# Patient Record
Sex: MALE | Race: WHITE | NOT HISPANIC OR LATINO | Employment: FULL TIME | URBAN - METROPOLITAN AREA
[De-identification: names, ages, dates, MRNs, and addresses within clinical notes are randomized per-mention and may not be internally consistent; named-entity substitution may affect disease eponyms.]

---

## 2023-04-26 ENCOUNTER — HOSPITAL ENCOUNTER (OUTPATIENT)
Dept: DATA CONVERSION | Facility: HOSPITAL | Age: 68
End: 2023-04-26
Attending: INTERNAL MEDICINE | Admitting: INTERNAL MEDICINE
Payer: MEDICARE

## 2023-04-26 DIAGNOSIS — K21.00 GASTRO-ESOPHAGEAL REFLUX DISEASE WITH ESOPHAGITIS, WITHOUT BLEEDING: ICD-10-CM

## 2023-04-26 DIAGNOSIS — D12.0 BENIGN NEOPLASM OF CECUM: ICD-10-CM

## 2023-04-26 DIAGNOSIS — I47.20 VENTRICULAR TACHYCARDIA, UNSPECIFIED (MULTI): ICD-10-CM

## 2023-04-26 DIAGNOSIS — Z79.82 LONG TERM (CURRENT) USE OF ASPIRIN: ICD-10-CM

## 2023-04-26 DIAGNOSIS — K21.9 GASTRO-ESOPHAGEAL REFLUX DISEASE WITHOUT ESOPHAGITIS: ICD-10-CM

## 2023-04-26 DIAGNOSIS — K62.5 HEMORRHAGE OF ANUS AND RECTUM: ICD-10-CM

## 2023-04-26 DIAGNOSIS — Z90.49 ACQUIRED ABSENCE OF OTHER SPECIFIED PARTS OF DIGESTIVE TRACT: ICD-10-CM

## 2023-04-26 DIAGNOSIS — I49.3 VENTRICULAR PREMATURE DEPOLARIZATION: ICD-10-CM

## 2023-04-26 DIAGNOSIS — R13.11 DYSPHAGIA, ORAL PHASE: ICD-10-CM

## 2023-04-26 DIAGNOSIS — K64.1 SECOND DEGREE HEMORRHOIDS: ICD-10-CM

## 2023-04-26 DIAGNOSIS — Z12.11 ENCOUNTER FOR SCREENING FOR MALIGNANT NEOPLASM OF COLON: ICD-10-CM

## 2023-04-26 DIAGNOSIS — I50.32 CHRONIC DIASTOLIC (CONGESTIVE) HEART FAILURE (MULTI): ICD-10-CM

## 2023-04-26 DIAGNOSIS — Z95.5 PRESENCE OF CORONARY ANGIOPLASTY IMPLANT AND GRAFT: ICD-10-CM

## 2023-05-05 LAB
COMPLETE PATHOLOGY REPORT: NORMAL
CONVERTED CLINICAL DIAGNOSIS-HISTORY: NORMAL
CONVERTED FINAL DIAGNOSIS: NORMAL
CONVERTED FINAL REPORT PDF LINK TO COPY AND PASTE: NORMAL
CONVERTED GROSS DESCRIPTION: NORMAL

## 2023-09-07 VITALS — BODY MASS INDEX: 26.3 KG/M2 | WEIGHT: 198.41 LBS | HEIGHT: 73 IN

## 2023-11-08 ENCOUNTER — HOSPITAL ENCOUNTER (OUTPATIENT)
Dept: CARDIOLOGY | Facility: CLINIC | Age: 68
Discharge: HOME | End: 2023-11-08
Payer: MEDICARE

## 2023-11-08 VITALS
DIASTOLIC BLOOD PRESSURE: 66 MMHG | WEIGHT: 205 LBS | BODY MASS INDEX: 27.77 KG/M2 | SYSTOLIC BLOOD PRESSURE: 112 MMHG | HEIGHT: 72 IN

## 2023-11-08 DIAGNOSIS — I47.29 OTHER VENTRICULAR TACHYCARDIA (MULTI): ICD-10-CM

## 2023-11-08 LAB
AORTIC VALVE MEAN GRADIENT: 5
AORTIC VALVE PEAK VELOCITY: 1.52
AV PEAK GRADIENT: 9.2
AVA (PEAK VEL): 2.07
AVA (VTI): 2.09
EJECTION FRACTION APICAL 4 CHAMBER: 39.7
EJECTION FRACTION: 46
LEFT VENTRICLE INTERNAL DIMENSION DIASTOLE: 5.72 (ref 3.5–6)
LEFT VENTRICULAR OUTFLOW TRACT DIAMETER: 2.21
MITRAL VALVE E/A RATIO: 0.77
RIGHT VENTRICLE FREE WALL PEAK S': 13
RIGHT VENTRICLE PEAK SYSTOLIC PRESSURE: 21.8
TRICUSPID ANNULAR PLANE SYSTOLIC EXCURSION: 2.1

## 2023-11-08 PROCEDURE — 93306 TTE W/DOPPLER COMPLETE: CPT

## 2023-11-08 PROCEDURE — 93306 TTE W/DOPPLER COMPLETE: CPT | Performed by: INTERNAL MEDICINE

## 2023-11-28 PROBLEM — R00.2 PALPITATIONS: Status: ACTIVE | Noted: 2023-11-28

## 2023-11-28 PROBLEM — I49.3 PVC (PREMATURE VENTRICULAR CONTRACTION): Status: ACTIVE | Noted: 2023-11-28

## 2023-11-28 PROBLEM — M47.816 SPONDYLOSIS OF LUMBAR REGION WITHOUT MYELOPATHY OR RADICULOPATHY: Status: ACTIVE | Noted: 2022-07-27

## 2023-11-28 PROBLEM — E83.40 MAGNESIUM DISORDER: Status: ACTIVE | Noted: 2023-03-01

## 2023-11-28 PROBLEM — N18.9 CKD (CHRONIC KIDNEY DISEASE): Status: ACTIVE | Noted: 2023-03-01

## 2023-11-28 PROBLEM — R09.89 CAROTID ARTERY BRUIT: Status: ACTIVE | Noted: 2023-11-28

## 2023-11-28 PROBLEM — M19.90 ARTHRITIS: Status: ACTIVE | Noted: 2023-11-28

## 2023-11-28 PROBLEM — R42 DIZZINESS AND GIDDINESS: Status: ACTIVE | Noted: 2023-11-28

## 2023-11-28 PROBLEM — I47.29 VENTRICULAR TACHYCARDIA, MONOMORPHIC (MULTI): Status: ACTIVE | Noted: 2023-11-28

## 2023-11-28 PROBLEM — R06.02 INCREASING SHORTNESS OF BREATH: Status: ACTIVE | Noted: 2023-11-28

## 2023-11-28 PROBLEM — K64.9 HEMORRHOIDS: Status: ACTIVE | Noted: 2023-11-28

## 2023-11-28 PROBLEM — J18.9 PNEUMONIA: Status: ACTIVE | Noted: 2023-11-28

## 2023-11-28 PROBLEM — I20.9 ANGINA PECTORIS (CMS-HCC): Status: ACTIVE | Noted: 2023-11-28

## 2023-11-28 PROBLEM — I25.10 CORONARY ATHEROSCLEROSIS: Status: ACTIVE | Noted: 2023-11-28

## 2023-11-28 PROBLEM — I50.20 HFREF (HEART FAILURE WITH REDUCED EJECTION FRACTION) (MULTI): Status: ACTIVE | Noted: 2023-11-28

## 2023-11-28 PROBLEM — I50.22 CHRONIC SYSTOLIC CONGESTIVE HEART FAILURE (MULTI): Status: ACTIVE | Noted: 2023-03-01

## 2023-11-28 PROBLEM — M54.50 ACUTE BILATERAL LOW BACK PAIN WITHOUT SCIATICA: Status: ACTIVE | Noted: 2022-06-14

## 2023-11-28 RX ORDER — ATORVASTATIN CALCIUM 20 MG/1
20 TABLET, FILM COATED ORAL DAILY
COMMUNITY
End: 2024-01-12

## 2023-11-28 RX ORDER — METOPROLOL SUCCINATE 25 MG/1
25 TABLET, EXTENDED RELEASE ORAL DAILY
COMMUNITY
End: 2023-12-07 | Stop reason: SDUPTHER

## 2023-11-28 RX ORDER — LISINOPRIL 10 MG/1
10 TABLET ORAL DAILY
COMMUNITY
End: 2023-12-07 | Stop reason: SDUPTHER

## 2023-11-28 RX ORDER — TICAGRELOR 90 MG/1
90 TABLET ORAL 2 TIMES DAILY
COMMUNITY
End: 2023-12-07 | Stop reason: SDUPTHER

## 2023-11-28 RX ORDER — ASPIRIN 81 MG/1
1 TABLET ORAL DAILY
COMMUNITY
Start: 2020-04-16

## 2023-11-29 ENCOUNTER — OFFICE VISIT (OUTPATIENT)
Dept: CARDIOLOGY | Facility: CLINIC | Age: 68
End: 2023-11-29
Payer: MEDICARE

## 2023-11-29 VITALS
DIASTOLIC BLOOD PRESSURE: 76 MMHG | HEIGHT: 73 IN | HEART RATE: 78 BPM | BODY MASS INDEX: 26.51 KG/M2 | OXYGEN SATURATION: 98 % | SYSTOLIC BLOOD PRESSURE: 130 MMHG | WEIGHT: 200 LBS

## 2023-11-29 DIAGNOSIS — I47.29 VENTRICULAR TACHYCARDIA, MONOMORPHIC (MULTI): ICD-10-CM

## 2023-11-29 DIAGNOSIS — I50.22 CHRONIC SYSTOLIC CONGESTIVE HEART FAILURE (MULTI): Primary | ICD-10-CM

## 2023-11-29 PROCEDURE — 99213 OFFICE O/P EST LOW 20 MIN: CPT | Performed by: INTERNAL MEDICINE

## 2023-11-29 PROCEDURE — 1126F AMNT PAIN NOTED NONE PRSNT: CPT | Performed by: INTERNAL MEDICINE

## 2023-11-29 PROCEDURE — 1159F MED LIST DOCD IN RCRD: CPT | Performed by: INTERNAL MEDICINE

## 2023-11-30 NOTE — ASSESSMENT & PLAN NOTE
1.  Ventricular tachycardia: This patient has a history of frequent PVCs with nonsustained ventricular tachycardia.  He had a VT ablation at [facility] in October 2020.  Follow-up in 10 to 12 months.    2.  Dilated cardiomyopathy: The patient has a history of a dilated nonischemic cardiomyopathy, ejection fraction 20 to 25%.  Some of the LV dysfunction was likely due to frequent PVCs.  His ejection fraction has now improved up to 40 to 45%.  Continue same medication regimen.    3.  Coronary artery disease: History of single-vessel CAD with stent placement in the LAD April 2020.  The patient is taking Brilinta and was unable to tolerate Plavix.

## 2023-11-30 NOTE — PROGRESS NOTES
"History Of Present Illness:      This is a 68-year-old male with a history of ventricular tachycardia.  He presents for a follow-up evaluation.  No complaints of palpitations, chest pain, or shortness of breath.    Review of Systems  Other review of systems negative     Last Recorded Vitals:      8/12/2022    11:49 AM 3/8/2023     2:58 PM 3/21/2023     2:06 PM 4/25/2023    10:54 AM 5/17/2023    11:16 AM 11/8/2023     9:57 AM 11/29/2023    10:07 AM   Vitals   Systolic 114 120 113  112 112 130   Diastolic 60 70 61  66 66 76   Heart Rate 51 65 41  51  78   Temp   36.7 °C (98 °F)       Height (in) 1.829 m (6') 1.829 m (6')  1.852 m (6' 0.91\") 1.829 m (6') 1.829 m (6') 1.854 m (6' 1\")   Weight (lb) 193 200 197 198.41 205 205 200   BMI 26.18 kg/m2 27.12 kg/m2 26.72 kg/m2 26.24 kg/m2 27.8 kg/m2 27.8 kg/m2 26.39 kg/m2   BSA (m2) 2.11 m2 2.15 m2 2.13 m2 2.15 m2 2.17 m2 2.17 m2 2.16 m2   Visit Report       Report          Allergies:  Patient has no known allergies.    Outpatient Medications:  Current Outpatient Medications   Medication Instructions    aspirin 81 mg EC tablet 1 tablet, oral, Daily    atorvastatin (LIPITOR) 20 mg, oral, Daily    Brilinta 90 mg, oral, 2 times daily    lisinopril 10 mg, oral, Daily    metoprolol succinate XL (TOPROL-XL) 25 mg, oral, Daily       Physical Exam:    General Appearance:  Alert, oriented, no distress  Skin:  Warm and dry  Head and Neck:  No elevation of JVP, no carotid bruits  Cardiac Exam:  Rhythm is regular, S1 and S2 are normal, grade 1/6 systolic murmur at the lower left sternal border  Lungs:  Clear to auscultation  Extremities:  no edema  Neurologic:  No focal deficits  Psychiatric:  Appropriate mood and behavior         Cardiology Tests:  I have personally review the diagnostic cardiac testing and my interpretation is as follows:    Echocardiogram July 2022: Ejection fraction 45 to 50% with mild aortic valve regurgitation  Echocardiogram November 2023: Ejection fraction 40 to " 45% with mild aortic valve regurgitation and mild to moderate mitral valve regurgitation      Assessment/Plan   Problem List Items Addressed This Visit             ICD-10-CM    Chronic systolic congestive heart failure (CMS/HCC) - Primary I50.22    Relevant Medications    metoprolol succinate XL (Toprol-XL) 25 mg 24 hr tablet    Brilinta 90 mg tablet    Ventricular tachycardia, monomorphic (CMS/HCC) I47.29     1.  Ventricular tachycardia: This patient has a history of frequent PVCs with nonsustained ventricular tachycardia.  He had a VT ablation at [facility] in October 2020.  Follow-up in 10 to 12 months.    2.  Dilated cardiomyopathy: The patient has a history of a dilated nonischemic cardiomyopathy, ejection fraction 20 to 25%.  Some of the LV dysfunction was likely due to frequent PVCs.  His ejection fraction has now improved up to 40 to 45%.  Continue same medication regimen.    3.  Coronary artery disease: History of single-vessel CAD with stent placement in the LAD April 2020.  The patient is taking Brilinta and was unable to tolerate Plavix.         Relevant Medications    metoprolol succinate XL (Toprol-XL) 25 mg 24 hr tablet    Brilinta 90 mg tablet       James C Ramicone, DO

## 2023-12-07 DIAGNOSIS — I50.22 CHRONIC SYSTOLIC CONGESTIVE HEART FAILURE (MULTI): ICD-10-CM

## 2023-12-07 DIAGNOSIS — I25.118 ATHEROSCLEROSIS OF CORONARY ARTERY WITH OTHER FORM OF ANGINA PECTORIS, UNSPECIFIED VESSEL OR LESION TYPE, UNSPECIFIED WHETHER NATIVE OR TRANSPLANTED HEART (CMS-HCC): ICD-10-CM

## 2023-12-08 DIAGNOSIS — I50.20 UNSPECIFIED SYSTOLIC (CONGESTIVE) HEART FAILURE (MULTI): ICD-10-CM

## 2023-12-08 DIAGNOSIS — I25.10 ATHEROSCLEROTIC HEART DISEASE OF NATIVE CORONARY ARTERY WITHOUT ANGINA PECTORIS: ICD-10-CM

## 2023-12-08 RX ORDER — TICAGRELOR 90 MG/1
90 TABLET ORAL 2 TIMES DAILY
Qty: 180 TABLET | Refills: 3 | Status: SHIPPED | OUTPATIENT
Start: 2023-12-08

## 2023-12-08 RX ORDER — METOPROLOL SUCCINATE 25 MG/1
25 TABLET, EXTENDED RELEASE ORAL DAILY
Qty: 90 TABLET | Refills: 3 | Status: SHIPPED | OUTPATIENT
Start: 2023-12-08

## 2023-12-08 RX ORDER — TICAGRELOR 90 MG/1
90 TABLET ORAL 2 TIMES DAILY
Qty: 180 TABLET | Refills: 0 | Status: SHIPPED | OUTPATIENT
Start: 2023-12-08

## 2023-12-08 RX ORDER — LISINOPRIL 10 MG/1
10 TABLET ORAL DAILY
Qty: 90 TABLET | Refills: 1 | Status: SHIPPED | OUTPATIENT
Start: 2023-12-08

## 2023-12-08 RX ORDER — METOPROLOL SUCCINATE 25 MG/1
25 TABLET, EXTENDED RELEASE ORAL DAILY
Qty: 90 TABLET | Refills: 1 | Status: SHIPPED | OUTPATIENT
Start: 2023-12-08

## 2023-12-08 RX ORDER — LISINOPRIL 10 MG/1
10 TABLET ORAL DAILY
Qty: 90 TABLET | Refills: 3 | Status: SHIPPED | OUTPATIENT
Start: 2023-12-08

## 2024-01-11 DIAGNOSIS — I25.10 ATHEROSCLEROTIC HEART DISEASE OF NATIVE CORONARY ARTERY WITHOUT ANGINA PECTORIS: ICD-10-CM

## 2024-01-12 RX ORDER — ATORVASTATIN CALCIUM 20 MG/1
20 TABLET, FILM COATED ORAL DAILY
Qty: 90 TABLET | Refills: 3 | Status: SHIPPED | OUTPATIENT
Start: 2024-01-12

## 2024-03-07 ENCOUNTER — TELEPHONE (OUTPATIENT)
Dept: CARDIOLOGY | Facility: CLINIC | Age: 69
End: 2024-03-07
Payer: MEDICARE

## 2024-03-07 NOTE — TELEPHONE ENCOUNTER
Pt called, states that since he had covid 6-7 weeks ago, he hasn't felt right. He feels shaky. Followed by Dr Ramicone, has seen Jhony approx 3 yrs ago.    Called pt and discussed with Dr Ramicone, recommends follow up with Jhony.    Pt scheduled for tomorrow at [place], pt accepts appt.

## 2024-03-08 ENCOUNTER — OFFICE VISIT (OUTPATIENT)
Dept: CARDIOLOGY | Facility: CLINIC | Age: 69
End: 2024-03-08
Payer: MEDICARE

## 2024-03-08 VITALS
SYSTOLIC BLOOD PRESSURE: 128 MMHG | BODY MASS INDEX: 25.6 KG/M2 | DIASTOLIC BLOOD PRESSURE: 76 MMHG | WEIGHT: 193.2 LBS | OXYGEN SATURATION: 98 % | HEIGHT: 73 IN | HEART RATE: 78 BPM

## 2024-03-08 DIAGNOSIS — R06.09 EXERTIONAL DYSPNEA: ICD-10-CM

## 2024-03-08 DIAGNOSIS — I25.10 ATHEROSCLEROSIS OF NATIVE CORONARY ARTERY OF NATIVE HEART WITHOUT ANGINA PECTORIS: ICD-10-CM

## 2024-03-08 DIAGNOSIS — I50.20 HFREF (HEART FAILURE WITH REDUCED EJECTION FRACTION) (MULTI): Primary | ICD-10-CM

## 2024-03-08 DIAGNOSIS — I47.29 VENTRICULAR TACHYCARDIA, MONOMORPHIC (MULTI): ICD-10-CM

## 2024-03-08 PROCEDURE — 1159F MED LIST DOCD IN RCRD: CPT | Performed by: PHYSICIAN ASSISTANT

## 2024-03-08 PROCEDURE — 99215 OFFICE O/P EST HI 40 MIN: CPT | Performed by: PHYSICIAN ASSISTANT

## 2024-03-08 PROCEDURE — 1126F AMNT PAIN NOTED NONE PRSNT: CPT | Performed by: PHYSICIAN ASSISTANT

## 2024-03-08 NOTE — PROGRESS NOTES
"Chief Complaint:   New Patient Visit (Check up post covid )     History Of Present Illness:    Spencer Maloney is a 68 y.o. male presenting as an acute add on for post-COVID symptoms including tremors and extreme fatigue.  Patient tested +COVID 5 weeks ago without marked symptoms initially.  Within the past 1-2 weeks he has been struggling with paramount fatigue and lethargy which waxes and wanes throughout the course of the day.  +Irregular pulse rates/rhythms recently as well.  +Chest discomfort yesterday while he was working, lasting 25 seconds prior to resolving spontaneously with deep breathing.  He continues regular aerobic activity including walking on a treadmill at least 2-3 times per week without chest discomfort nor subjective anginal symptoms.  +Known history of nonischemic cardiomyopathy deemed arrhythmogenic at the time in which patient displayed very frequent PVC's (now s/p VT ablation).  Most recent 2D echo displayed persistent mild reduction in LV systolic fx at EF 40-45% with mild/moderate MR, mild AI.     Last Recorded Vitals:  Vitals:    03/08/24 1337   BP: 128/76   BP Location: Left arm   Patient Position: Sitting   BP Cuff Size: Adult   Pulse: 78   SpO2: 98%   Weight: 87.6 kg (193 lb 3.2 oz)   Height: 1.854 m (6' 1\")       Past Medical History:  He has a past medical history of Abnormal findings on diagnostic imaging of heart and coronary circulation.    Past Surgical History:  He has a past surgical history that includes Other surgical history (03/08/2019); Other surgical history (06/02/2020); and Other surgical history (04/16/2020).      Social History:  He reports that he has never smoked. He has never used smokeless tobacco. No history on file for alcohol use and drug use.    Family History:  No family history on file.     Allergies:  Patient has no known allergies.    Outpatient Medications:  Current Outpatient Medications   Medication Instructions    aspirin 81 mg EC tablet 1 tablet, oral, " Daily    atorvastatin (LIPITOR) 20 mg, oral, Daily    Brilinta 90 mg, oral, 2 times daily    Brilinta 90 mg, oral, 2 times daily    lisinopril 10 mg, oral, Daily    lisinopril 10 mg, oral, Daily    metoprolol succinate XL (TOPROL-XL) 25 mg, oral, Daily    metoprolol succinate XL (TOPROL-XL) 25 mg, oral, Daily       Physical Exam:  Constitutional: awake and alert, oriented ×3, no apparent distress  Skin: warm, dry, good turgor no obvious lesions  Eyes: pupils equal, round, reactive to light, conjunctiva pink and noninjected, no discharge  HENT: normocephalic and atraumatic, mucous membranes moist, trachea midline with no masses/goiter  Cardiovascular: S1/S2 regular, no murmur no rubs/gallops, no carotid bruits, no JVD  Pulmonary: symmetrical chest expansion, lungs are clear to auscultation bilaterally, no wheezes/rales/rhonchi, normal effort  Abdomen: nontender, nondistended, active bowel sounds, no ascites  Extremities: no cyanosis, clubbing, no LE edema no lesions; palpable pedal pulses  Neurologic: cranial nerves II - XII grossly intact, stable gait, no tremor       Last Labs:  CBC -  Lab Results   Component Value Date    WBC 6.9 07/30/2022    HGB 13.7 07/30/2022    HCT 41.1 07/30/2022    MCV 92 07/30/2022     07/30/2022       CMP -  Lab Results   Component Value Date    CALCIUM 9.2 07/30/2022    PROT 6.2 (L) 07/30/2022    ALBUMIN 4.2 07/30/2022    AST 73 (H) 07/30/2022    ALT 96 (H) 07/30/2022    ALKPHOS 60 07/30/2022    BILITOT 0.7 07/30/2022       LIPID PANEL -   Lab Results   Component Value Date    CHOL 173 04/01/2020    TRIG 158 (H) 04/01/2020    HDL 34.6 (A) 04/01/2020    CHHDL 5.0 04/01/2020    LDLF 107 (H) 04/01/2020    VLDL 32 04/01/2020       RENAL FUNCTION PANEL -   Lab Results   Component Value Date    GLUCOSE 95 07/30/2022     07/30/2022    K 4.1 07/30/2022     07/30/2022    CO2 25 07/30/2022    ANIONGAP 11 07/30/2022    BUN 17 07/30/2022    CREATININE 1.05 07/30/2022    GFRMALE 78  07/30/2022    CALCIUM 9.2 07/30/2022    ALBUMIN 4.2 07/30/2022        Lab Results   Component Value Date    BNP 26 07/30/2022       Last Cardiology Tests:  ECG:  No results found for this or any previous visit from the past 1095 days.      Echo:  Transthoracic Echo (TTE) Complete 11/08/2023      Ejection Fractions:  EF   Date/Time Value Ref Range Status   11/08/2023 10:55 AM 46         Cath:  No results found for this or any previous visit from the past 1095 days.      Stress Test:  No results found for this or any previous visit from the past 1095 days.      Cardiac Imaging:  No results found for this or any previous visit from the past 1095 days.      Assessment/Plan   Problem List Items Addressed This Visit             ICD-10-CM       Cardiac and Vasculature    Coronary atherosclerosis I25.10    Relevant Orders    Comprehensive metabolic panel    CBC    B-Type Natriuretic Peptide    Magnesium    Transthoracic echo (TTE) complete    HFrEF (heart failure with reduced ejection fraction) (CMS/HCC) - Primary I50.20    Relevant Orders    Comprehensive metabolic panel    CBC    B-Type Natriuretic Peptide    Magnesium    Transthoracic echo (TTE) complete    Ventricular tachycardia, monomorphic (CMS/HCC) I47.29    Exertional dyspnea R06.09    Relevant Orders    Comprehensive metabolic panel    CBC    B-Type Natriuretic Peptide    Magnesium    Transthoracic echo (TTE) complete     -Serum workup including CBC, CMP, serum Mg, BNP    -We will arrange for a 2D echocardiogram to assess LVEF and valvular function.    -F/U with me in 6 weeks    Jhony Lao PA-C

## 2024-03-11 ENCOUNTER — LAB (OUTPATIENT)
Dept: LAB | Facility: LAB | Age: 69
End: 2024-03-11
Payer: MEDICARE

## 2024-03-11 DIAGNOSIS — I50.20 HFREF (HEART FAILURE WITH REDUCED EJECTION FRACTION) (MULTI): ICD-10-CM

## 2024-03-11 DIAGNOSIS — I25.10 ATHEROSCLEROSIS OF NATIVE CORONARY ARTERY OF NATIVE HEART WITHOUT ANGINA PECTORIS: ICD-10-CM

## 2024-03-11 DIAGNOSIS — R06.09 EXERTIONAL DYSPNEA: ICD-10-CM

## 2024-03-11 LAB
ALBUMIN SERPL BCP-MCNC: 4.3 G/DL (ref 3.4–5)
ALP SERPL-CCNC: 65 U/L (ref 33–136)
ALT SERPL W P-5'-P-CCNC: 115 U/L (ref 10–52)
ANION GAP SERPL CALC-SCNC: 11 MMOL/L (ref 10–20)
AST SERPL W P-5'-P-CCNC: 88 U/L (ref 9–39)
BILIRUB SERPL-MCNC: 0.8 MG/DL (ref 0–1.2)
BNP SERPL-MCNC: 48 PG/ML (ref 0–99)
BUN SERPL-MCNC: 19 MG/DL (ref 6–23)
CALCIUM SERPL-MCNC: 9.7 MG/DL (ref 8.6–10.3)
CHLORIDE SERPL-SCNC: 106 MMOL/L (ref 98–107)
CO2 SERPL-SCNC: 27 MMOL/L (ref 21–32)
CREAT SERPL-MCNC: 0.99 MG/DL (ref 0.5–1.3)
EGFRCR SERPLBLD CKD-EPI 2021: 83 ML/MIN/1.73M*2
ERYTHROCYTE [DISTWIDTH] IN BLOOD BY AUTOMATED COUNT: 12.5 % (ref 11.5–14.5)
GLUCOSE SERPL-MCNC: 88 MG/DL (ref 74–99)
HCT VFR BLD AUTO: 42.3 % (ref 41–52)
HGB BLD-MCNC: 14 G/DL (ref 13.5–17.5)
MAGNESIUM SERPL-MCNC: 1.71 MG/DL (ref 1.6–2.4)
MCH RBC QN AUTO: 30.3 PG (ref 26–34)
MCHC RBC AUTO-ENTMCNC: 33.1 G/DL (ref 32–36)
MCV RBC AUTO: 92 FL (ref 80–100)
NRBC BLD-RTO: 0 /100 WBCS (ref 0–0)
PLATELET # BLD AUTO: 240 X10*3/UL (ref 150–450)
POTASSIUM SERPL-SCNC: 4.6 MMOL/L (ref 3.5–5.3)
PROT SERPL-MCNC: 6.3 G/DL (ref 6.4–8.2)
RBC # BLD AUTO: 4.62 X10*6/UL (ref 4.5–5.9)
SODIUM SERPL-SCNC: 139 MMOL/L (ref 136–145)
WBC # BLD AUTO: 5.9 X10*3/UL (ref 4.4–11.3)

## 2024-03-11 PROCEDURE — 36415 COLL VENOUS BLD VENIPUNCTURE: CPT

## 2024-03-11 PROCEDURE — 85027 COMPLETE CBC AUTOMATED: CPT

## 2024-03-11 PROCEDURE — 83735 ASSAY OF MAGNESIUM: CPT

## 2024-03-11 PROCEDURE — 83880 ASSAY OF NATRIURETIC PEPTIDE: CPT

## 2024-03-11 PROCEDURE — 80053 COMPREHEN METABOLIC PANEL: CPT

## 2024-04-01 ENCOUNTER — HOSPITAL ENCOUNTER (OUTPATIENT)
Dept: CARDIOLOGY | Facility: CLINIC | Age: 69
Discharge: HOME | End: 2024-04-01
Payer: MEDICARE

## 2024-04-01 VITALS — HEIGHT: 73 IN | BODY MASS INDEX: 25.58 KG/M2 | WEIGHT: 193 LBS

## 2024-04-01 DIAGNOSIS — I50.20 HFREF (HEART FAILURE WITH REDUCED EJECTION FRACTION) (MULTI): ICD-10-CM

## 2024-04-01 DIAGNOSIS — R06.09 EXERTIONAL DYSPNEA: ICD-10-CM

## 2024-04-01 DIAGNOSIS — I25.10 ATHEROSCLEROSIS OF NATIVE CORONARY ARTERY OF NATIVE HEART WITHOUT ANGINA PECTORIS: ICD-10-CM

## 2024-04-01 PROCEDURE — 93306 TTE W/DOPPLER COMPLETE: CPT | Performed by: INTERNAL MEDICINE

## 2024-04-01 PROCEDURE — 93306 TTE W/DOPPLER COMPLETE: CPT

## 2024-04-02 LAB
AORTIC VALVE MEAN GRADIENT: 4.7 MMHG
AORTIC VALVE PEAK VELOCITY: 1.65 M/S
AV PEAK GRADIENT: 10.8 MMHG
AVA (PEAK VEL): 2.13 CM2
AVA (VTI): 2.43 CM2
EJECTION FRACTION APICAL 4 CHAMBER: 44.9
LEFT VENTRICLE INTERNAL DIMENSION DIASTOLE: 5.47 CM (ref 3.5–6)
LEFT VENTRICULAR OUTFLOW TRACT DIAMETER: 2.04 CM
LV EJECTION FRACTION BIPLANE: 49 %
MITRAL VALVE E/A RATIO: 1.02
RIGHT VENTRICLE FREE WALL PEAK S': 16 CM/S
RIGHT VENTRICLE PEAK SYSTOLIC PRESSURE: 24 MMHG
TRICUSPID ANNULAR PLANE SYSTOLIC EXCURSION: 2.6 CM

## 2024-04-05 PROBLEM — K21.9 GASTROESOPHAGEAL REFLUX DISEASE: Status: ACTIVE | Noted: 2024-04-05

## 2024-04-16 ENCOUNTER — OFFICE VISIT (OUTPATIENT)
Dept: CARDIOLOGY | Facility: CLINIC | Age: 69
End: 2024-04-16
Payer: MEDICARE

## 2024-04-16 VITALS
OXYGEN SATURATION: 97 % | WEIGHT: 195.4 LBS | BODY MASS INDEX: 25.9 KG/M2 | HEIGHT: 73 IN | SYSTOLIC BLOOD PRESSURE: 108 MMHG | DIASTOLIC BLOOD PRESSURE: 58 MMHG | HEART RATE: 72 BPM

## 2024-04-16 DIAGNOSIS — I50.20 HFREF (HEART FAILURE WITH REDUCED EJECTION FRACTION) (MULTI): Primary | ICD-10-CM

## 2024-04-16 DIAGNOSIS — R06.09 EXERTIONAL DYSPNEA: ICD-10-CM

## 2024-04-16 DIAGNOSIS — R00.2 PALPITATIONS: ICD-10-CM

## 2024-04-16 DIAGNOSIS — I25.10 ATHEROSCLEROSIS OF NATIVE CORONARY ARTERY OF NATIVE HEART WITHOUT ANGINA PECTORIS: ICD-10-CM

## 2024-04-16 DIAGNOSIS — I47.29 VENTRICULAR TACHYCARDIA, MONOMORPHIC (MULTI): ICD-10-CM

## 2024-04-16 PROCEDURE — 99214 OFFICE O/P EST MOD 30 MIN: CPT | Performed by: PHYSICIAN ASSISTANT

## 2024-04-16 PROCEDURE — 1159F MED LIST DOCD IN RCRD: CPT | Performed by: PHYSICIAN ASSISTANT

## 2024-04-16 NOTE — PROGRESS NOTES
"Chief Complaint:   Follow-up (6 week follow up on testing )     History Of Present Illness:    4/16/24  2D echo displays normalization of LV systolic function in comparison to previous studies.  Patient remains normotensive on current therapy and is tolerating all medications without adverse effects.  Patient denies chest pain, chest pressure, palpitations, dyspnea on exertion, shortness of breath at rest, diaphoresis, nausea/vomiting, back pain, headache, lightheadedness, dizziness, syncope or presyncopal episodes, active bleeding signs or symptoms, excessive weight gain, muscle or joint pain, claudication.      3/8/24  Spencer Maloney is a 68 y.o. male presenting as an acute add on for post-COVID symptoms including tremors and extreme fatigue.  Patient tested +COVID 5 weeks ago without marked symptoms initially.  Within the past 1-2 weeks he has been struggling with paramount fatigue and lethargy which waxes and wanes throughout the course of the day.  +Irregular pulse rates/rhythms recently as well.  +Chest discomfort yesterday while he was working, lasting 25 seconds prior to resolving spontaneously with deep breathing.  He continues regular aerobic activity including walking on a treadmill at least 2-3 times per week without chest discomfort nor subjective anginal symptoms.  +Known history of nonischemic cardiomyopathy deemed arrhythmogenic at the time in which patient displayed very frequent PVC's (now s/p VT ablation).  Most recent 2D echo displayed persistent mild reduction in LV systolic fx at EF 40-45% with mild/moderate MR, mild AI.     Last Recorded Vitals:  Vitals:    04/16/24 1325   BP: 108/58   BP Location: Left arm   Patient Position: Sitting   BP Cuff Size: Adult   Pulse: 72   SpO2: 97%   Weight: 88.6 kg (195 lb 6.4 oz)   Height: 1.854 m (6' 1\")       Past Medical History:  He has a past medical history of Abnormal findings on diagnostic imaging of heart and coronary circulation.    Past Surgical " History:  He has a past surgical history that includes Other surgical history (03/08/2019); Other surgical history (06/02/2020); and Other surgical history (04/16/2020).      Social History:  He reports that he has never smoked. He has never used smokeless tobacco. No history on file for alcohol use and drug use.    Family History:  No family history on file.     Allergies:  Patient has no known allergies.    Outpatient Medications:  Current Outpatient Medications   Medication Instructions    aspirin 81 mg EC tablet 1 tablet, oral, Daily    atorvastatin (LIPITOR) 20 mg, oral, Daily    Brilinta 90 mg, oral, 2 times daily    Brilinta 90 mg, oral, 2 times daily    lisinopril 10 mg, oral, Daily    lisinopril 10 mg, oral, Daily    metoprolol succinate XL (TOPROL-XL) 25 mg, oral, Daily    metoprolol succinate XL (TOPROL-XL) 25 mg, oral, Daily       Physical Exam:  Constitutional: awake and alert, oriented ×3, no apparent distress  Skin: warm, dry, good turgor no obvious lesions  Eyes: pupils equal, round, reactive to light, conjunctiva pink and noninjected, no discharge  HENT: normocephalic and atraumatic, mucous membranes moist, trachea midline with no masses/goiter  Cardiovascular: S1/S2 regular, no murmur no rubs/gallops, no carotid bruits, no JVD  Pulmonary: symmetrical chest expansion, lungs are clear to auscultation bilaterally, no wheezes/rales/rhonchi, normal effort  Abdomen: nontender, nondistended, active bowel sounds, no ascites  Extremities: no cyanosis, clubbing, no LE edema no lesions; palpable pedal pulses  Neurologic: cranial nerves II - XII grossly intact, stable gait, no tremor       Last Labs:  CBC -  Lab Results   Component Value Date    WBC 5.9 03/11/2024    HGB 14.0 03/11/2024    HCT 42.3 03/11/2024    MCV 92 03/11/2024     03/11/2024       CMP -  Lab Results   Component Value Date    CALCIUM 9.7 03/11/2024    PROT 6.3 (L) 03/11/2024    ALBUMIN 4.3 03/11/2024    AST 88 (H) 03/11/2024    ALT  115 (H) 03/11/2024    ALKPHOS 65 03/11/2024    BILITOT 0.8 03/11/2024       LIPID PANEL -   Lab Results   Component Value Date    CHOL 173 04/01/2020    TRIG 158 (H) 04/01/2020    HDL 34.6 (A) 04/01/2020    CHHDL 5.0 04/01/2020    LDLF 107 (H) 04/01/2020    VLDL 32 04/01/2020       RENAL FUNCTION PANEL -   Lab Results   Component Value Date    GLUCOSE 88 03/11/2024     03/11/2024    K 4.6 03/11/2024     03/11/2024    CO2 27 03/11/2024    ANIONGAP 11 03/11/2024    BUN 19 03/11/2024    CREATININE 0.99 03/11/2024    GFRMALE 78 07/30/2022    CALCIUM 9.7 03/11/2024    ALBUMIN 4.3 03/11/2024        Lab Results   Component Value Date    BNP 48 03/11/2024       Last Cardiology Tests:  ECG:  No results found for this or any previous visit from the past 1095 days.      Echo:  Transthoracic Echo (TTE) Complete 11/08/2023      Ejection Fractions:  EF   Date/Time Value Ref Range Status   11/08/2023 10:55 AM 46         Cath:  1. Single vessel coronary artery disease without proximal left anterior descending involvement.   2. Single vessel CAD s/p PCI Xience Frida 3.0x18 mm, post dilated to 3.0 mm distally and 4.0 proximally.   3. Preserved CO/CI and very low filling pressures.   4. Medical optimization for HFrEF.      Stress Test:  No results found for this or any previous visit from the past 1095 days.      Cardiac Imaging:  No results found for this or any previous visit from the past 1095 days.      Assessment/Plan   Problem List Items Addressed This Visit             ICD-10-CM       Cardiac and Vasculature    HFrEF (heart failure with reduced ejection fraction) (Multi) - Primary I50.20    Palpitations R00.2    Ventricular tachycardia, monomorphic (Multi) I47.29    Exertional dyspnea R06.09       -Normalization of previous mildly reduced LVEF    -Asymptomatic at this point, continue current therapy as prescribed    -F/U in clinic as scheduled    Jhony Lao PA-C

## 2024-04-17 ENCOUNTER — APPOINTMENT (OUTPATIENT)
Dept: CARDIOLOGY | Facility: CLINIC | Age: 69
End: 2024-04-17
Payer: MEDICARE

## 2024-06-04 ENCOUNTER — TELEPHONE (OUTPATIENT)
Dept: CARDIOLOGY | Facility: CLINIC | Age: 69
End: 2024-06-04
Payer: MEDICARE

## 2024-06-04 DIAGNOSIS — I50.22 CHRONIC SYSTOLIC CONGESTIVE HEART FAILURE (MULTI): ICD-10-CM

## 2024-06-04 RX ORDER — LISINOPRIL 10 MG/1
10 TABLET ORAL DAILY
Qty: 90 TABLET | Refills: 3 | Status: CANCELLED | OUTPATIENT
Start: 2024-06-04

## 2024-06-04 NOTE — TELEPHONE ENCOUNTER
Called pharmacy, they do have refills for lisinopril, they will fill.     Attempted to call pt 3x--no answer, VM full.

## 2024-07-10 NOTE — PROGRESS NOTES
History Of Present Illness  HPI   The patient is a 68-year-old male who complains of a left inguinal hernia.  The hernia has been present for at least 5 years.  Initially it was asymptomatic but recently has been causing left inguinal pain.    Past medical history:  Laparoscopic cholecystectomy with cholangiogram on March 1, 2019 for acute cholecystitis.  Ventricular tachycardia status post ablation at [facility] in October 2020.  Dilated nonischemic cardiomyopathy.  Coronary disease status post stent placement in April 2020.  On Brilinta and aspirin.  Chronic systolic congestive heart failure  COVID-19 infection in February 2024.  Post COVID fatigue and lethargy.  Mild reduction in LV systolic function which subsequently normalized.    Past Medical History  He has a past medical history of Abnormal findings on diagnostic imaging of heart and coronary circulation.    Surgical History  He has a past surgical history that includes Other surgical history (03/08/2019); Other surgical history (06/02/2020); and Other surgical history (04/16/2020).     Allergies  Patient has no known allergies.    Social History  He reports that he has never smoked. He has never used smokeless tobacco. He reports current alcohol use. He reports that he does not use drugs.    Family History  No family history on file.    Review of Systems  Review of Systems:  Constitutional:  no fever, no chills, no significant weight change  Neurological: No history of CVA or seizure disorder  Eyes: No pain, no recent visual change  ENT:  No recent hearing loss  Neck: No pain  Cardiovascular: As above.    Pulmonary: No shortness of breath, no history of pulmonary disease such as pneumonia or COPD  Breast: No history of breast disease or breast mass  Gastrointestinal: Left inguinal pain.  No nausea or vomiting, no constipation or diarrhea or blood in the stool.  No history of ulcers.  No liver, gallbladder or pancreas disease.  No intestinal  disorder.  Genitourinary: No hematuria or dysuria, no kidney disease  Musculoskeletal:  no arthralgia, no muscle or bone pain  Integumentary:  no rash  Psychiatric:  No anxiety or depression  Endocrine:  no history of diabetes  Hematologic/Lymphatic: No easy bruising or bleeding    Last Recorded Vitals  Blood pressure 119/67, pulse 76, temperature 36 °C (96.8 °F), temperature source Temporal, resp. rate 16, height 1.829 m (6'), weight 85.5 kg (188 lb 9.6 oz), SpO2 94%.    Physical Exam  Constitutional: Well-developed, well-nourished, alert and oriented, no acute distress  Skin: Warm and dry, no lesions, no rashes, no jaundice  HEENT: Normocephalic, atraumatic, EOMI, no scleral icterus, eyes have no redness or swelling or discharge, external inspection of ears and nose is normal, mucous membranes moist  Neck: Soft, nontender, no mass or adenopathy  Cardiac: Regular rate and rhythm, no murmur  Chest: Patent airway, clear to auscultation, normal breath sounds with good chest expansion, no wheezes or rales or rhonchi noted, thorax symmetric  Abdomen: Nondistended, positive bowel sounds, soft, nontender, no mass.  Left inguinal hernia, reducible but tender.  No right inguinal hernia or femoral hernias palpable.  Rectal: Not performed  Extremities: No injury, no lower extremity edema or calf tenderness  Lymphatic: No cervical adenopathy  Musculoskeletal: Range of motion intact, no joint swelling, normal strength  Neurological: Alert and oriented x3, intact sensory and motor function, no obvious focal neurologic abnormalities, normal gait  Psychological: Appropriate mood and behavior  Patient declined a chaperone    Relevant Results  Labs from March 11, 2024: WBC 5.9, hemoglobin 14.0, platelet 240  Electrolytes, BUN/creatinine, glucose normal     Assessment/Plan   Diagnoses and all orders for this visit:  Left inguinal hernia  Atherosclerosis of native coronary artery of native heart without angina pectoris    Symptomatic,  but reducible left inguinal hernia.  Recommend left inguinal herniorrhaphy with mesh either open or robotic assisted laparoscopic.  I discussed the procedure and risks with the patient. The risks include bleeding, infection, mesh infection, recurrence, injury to surrounding structures such as nerves/blood vessels/intestine/bladder, chronic postop pain, cardiac/pulmonary complications.   If the mesh becomes infected it could require excision.  I discussed the alternative of hernia repair without mesh and observation.  Because he is on dual antiplatelet therapy, I would recommend open repair of the hernia.  Request a cardiac risk assessment note and recommendations for the Brilinta and aspirin.  Electronic consent obtained    July 24, 2024 addendum:  I received a cardiac risk assessment note from Jhony Lao PA-C.  He reports that the patient is cleared for hernia operation.  May hold aspirin/Brilinta for 5 days preop and resume as soon as possible postop.    Alfredito Benavidez MD

## 2024-07-12 ENCOUNTER — PREP FOR PROCEDURE (OUTPATIENT)
Dept: SURGERY | Facility: CLINIC | Age: 69
End: 2024-07-12

## 2024-07-12 ENCOUNTER — OFFICE VISIT (OUTPATIENT)
Dept: SURGERY | Facility: CLINIC | Age: 69
End: 2024-07-12
Payer: MEDICARE

## 2024-07-12 VITALS
HEIGHT: 72 IN | SYSTOLIC BLOOD PRESSURE: 119 MMHG | WEIGHT: 188.6 LBS | RESPIRATION RATE: 16 BRPM | DIASTOLIC BLOOD PRESSURE: 67 MMHG | BODY MASS INDEX: 25.55 KG/M2 | TEMPERATURE: 96.8 F | HEART RATE: 76 BPM | OXYGEN SATURATION: 94 %

## 2024-07-12 DIAGNOSIS — K40.90 LEFT INGUINAL HERNIA: Primary | ICD-10-CM

## 2024-07-12 DIAGNOSIS — I25.10 ATHEROSCLEROSIS OF NATIVE CORONARY ARTERY OF NATIVE HEART WITHOUT ANGINA PECTORIS: ICD-10-CM

## 2024-07-12 PROCEDURE — 1159F MED LIST DOCD IN RCRD: CPT | Performed by: SURGERY

## 2024-07-12 PROCEDURE — 99204 OFFICE O/P NEW MOD 45 MIN: CPT | Performed by: SURGERY

## 2024-07-12 PROCEDURE — 1125F AMNT PAIN NOTED PAIN PRSNT: CPT | Performed by: SURGERY

## 2024-07-12 PROCEDURE — 1036F TOBACCO NON-USER: CPT | Performed by: SURGERY

## 2024-07-12 RX ORDER — CEFAZOLIN SODIUM 2 G/100ML
2 INJECTION, SOLUTION INTRAVENOUS ONCE
OUTPATIENT
Start: 2024-07-12 | End: 2024-07-12

## 2024-07-12 RX ORDER — SODIUM CHLORIDE, SODIUM LACTATE, POTASSIUM CHLORIDE, CALCIUM CHLORIDE 600; 310; 30; 20 MG/100ML; MG/100ML; MG/100ML; MG/100ML
100 INJECTION, SOLUTION INTRAVENOUS CONTINUOUS
OUTPATIENT
Start: 2024-07-12

## 2024-07-12 ASSESSMENT — ENCOUNTER SYMPTOMS
OCCASIONAL FEELINGS OF UNSTEADINESS: 0
LOSS OF SENSATION IN FEET: 0
DEPRESSION: 0

## 2024-07-12 ASSESSMENT — PAIN SCALES - GENERAL: PAINLEVEL: 3

## 2024-07-12 NOTE — LETTER
July 12, 2024     Darius Steward MD  [id number]  [facility] [address]  [facility] [address]    Patient: Spencer Maloney   YOB: 1955   Date of Visit: 7/12/2024       Dear Dr. Darius Steward MD:    Thank you for referring Spencer Maloney to me for evaluation. Below are my notes for this consultation.  If you have questions, please do not hesitate to call me. I look forward to following your patient along with you.       Sincerely,     Alfredito Benavidez MD      CC: James C Ramicone, DO  ______________________________________________________________________________________    History Of Present Illness  HPI   The patient is a 68-year-old male who complains of a left inguinal hernia.  The hernia has been present for at least 5 years.  Initially it was asymptomatic but recently has been causing left inguinal pain.    Past medical history:  Laparoscopic cholecystectomy with cholangiogram on March 1, 2019 for acute cholecystitis.  Ventricular tachycardia status post ablation at [facility] in October 2020.  Dilated nonischemic cardiomyopathy.  Coronary disease status post stent placement in April 2020.  On Brilinta and aspirin.  Chronic systolic congestive heart failure  COVID-19 infection in February 2024.  Post COVID fatigue and lethargy.  Mild reduction in LV systolic function which subsequently normalized.    Past Medical History  He has a past medical history of Abnormal findings on diagnostic imaging of heart and coronary circulation.    Surgical History  He has a past surgical history that includes Other surgical history (03/08/2019); Other surgical history (06/02/2020); and Other surgical history (04/16/2020).     Allergies  Patient has no known allergies.    Social History  He reports that he has never smoked. He has never used smokeless tobacco. He reports current alcohol use. He reports that he does not use drugs.    Family History  No family history on file.    Review  of Systems  Review of Systems:  Constitutional:  no fever, no chills, no significant weight change  Neurological: No history of CVA or seizure disorder  Eyes: No pain, no recent visual change  ENT:  No recent hearing loss  Neck: No pain  Cardiovascular: As above.    Pulmonary: No shortness of breath, no history of pulmonary disease such as pneumonia or COPD  Breast: No history of breast disease or breast mass  Gastrointestinal: Left inguinal pain.  No nausea or vomiting, no constipation or diarrhea or blood in the stool.  No history of ulcers.  No liver, gallbladder or pancreas disease.  No intestinal disorder.  Genitourinary: No hematuria or dysuria, no kidney disease  Musculoskeletal:  no arthralgia, no muscle or bone pain  Integumentary:  no rash  Psychiatric:  No anxiety or depression  Endocrine:  no history of diabetes  Hematologic/Lymphatic: No easy bruising or bleeding    Last Recorded Vitals  Blood pressure 119/67, pulse 76, temperature 36 °C (96.8 °F), temperature source Temporal, resp. rate 16, height 1.829 m (6'), weight 85.5 kg (188 lb 9.6 oz), SpO2 94%.    Physical Exam  Constitutional: Well-developed, well-nourished, alert and oriented, no acute distress  Skin: Warm and dry, no lesions, no rashes, no jaundice  HEENT: Normocephalic, atraumatic, EOMI, no scleral icterus, eyes have no redness or swelling or discharge, external inspection of ears and nose is normal, mucous membranes moist  Neck: Soft, nontender, no mass or adenopathy  Cardiac: Regular rate and rhythm, no murmur  Chest: Patent airway, clear to auscultation, normal breath sounds with good chest expansion, no wheezes or rales or rhonchi noted, thorax symmetric  Abdomen: Nondistended, positive bowel sounds, soft, nontender, no mass.  Left inguinal hernia, reducible but tender.  No right inguinal hernia or femoral hernias palpable.  Rectal: Not performed  Extremities: No injury, no lower extremity edema or calf tenderness  Lymphatic: No  cervical adenopathy  Musculoskeletal: Range of motion intact, no joint swelling, normal strength  Neurological: Alert and oriented x3, intact sensory and motor function, no obvious focal neurologic abnormalities, normal gait  Psychological: Appropriate mood and behavior  Patient declined a chaperone    Relevant Results  Labs from March 11, 2024: WBC 5.9, hemoglobin 14.0, platelet 240  Electrolytes, BUN/creatinine, glucose normal     Assessment/Plan  Diagnoses and all orders for this visit:  Left inguinal hernia  Atherosclerosis of native coronary artery of native heart without angina pectoris    Symptomatic, but reducible left inguinal hernia.  Recommend left inguinal herniorrhaphy with mesh either open or robotic assisted laparoscopic.  I discussed the procedure and risks with the patient. The risks include bleeding, infection, mesh infection, recurrence, injury to surrounding structures such as nerves/blood vessels/intestine/bladder, chronic postop pain, cardiac/pulmonary complications.   If the mesh becomes infected it could require excision.  I discussed the alternative of hernia repair without mesh and observation.  Because he is on dual antiplatelet therapy, I would recommend open repair of the hernia.  Request a cardiac risk assessment note and recommendations for the Brilinta and aspirin.  Electronic consent obtained      Alfredito Benavidez MD

## 2024-07-17 ENCOUNTER — TELEPHONE (OUTPATIENT)
Dept: CARDIOLOGY | Facility: CLINIC | Age: 69
End: 2024-07-17

## 2024-07-17 NOTE — TELEPHONE ENCOUNTER
Pt will be scheduled for left inguinal hernia repair with Dr Benavidez, under MAC anesthesia. Cardiac clearance requested. Pt is on brilinta and asa, how long can they be held?     Last OV with Jhony was 4/16/24. Next OV with Ramicone is 9/11.

## 2024-08-19 ENCOUNTER — APPOINTMENT (OUTPATIENT)
Dept: SURGERY | Facility: CLINIC | Age: 69
End: 2024-08-19
Payer: MEDICARE

## 2024-08-19 VITALS
WEIGHT: 190.6 LBS | RESPIRATION RATE: 16 BRPM | OXYGEN SATURATION: 98 % | BODY MASS INDEX: 25.81 KG/M2 | HEART RATE: 59 BPM | HEIGHT: 72 IN | TEMPERATURE: 97.4 F | SYSTOLIC BLOOD PRESSURE: 122 MMHG | DIASTOLIC BLOOD PRESSURE: 71 MMHG

## 2024-08-19 DIAGNOSIS — K40.90 LEFT INGUINAL HERNIA: Primary | ICD-10-CM

## 2024-08-19 PROCEDURE — 3008F BODY MASS INDEX DOCD: CPT | Performed by: SURGERY

## 2024-08-19 PROCEDURE — 99213 OFFICE O/P EST LOW 20 MIN: CPT | Performed by: SURGERY

## 2024-08-19 PROCEDURE — 1159F MED LIST DOCD IN RCRD: CPT | Performed by: SURGERY

## 2024-08-19 PROCEDURE — 1036F TOBACCO NON-USER: CPT | Performed by: SURGERY

## 2024-08-19 NOTE — H&P (VIEW-ONLY)
History Of Present Illness  HPI   July 12, 2024  The patient is a 68-year-old male who complains of a left inguinal hernia.  The hernia has been present for at least 5 years.  Initially it was asymptomatic but recently has been causing left inguinal pain.     August 19, 2024  The patient reports no change in his hernia.  No change in his medical history.    Past medical history:  Laparoscopic cholecystectomy with cholangiogram on March 1, 2019 for acute cholecystitis.  Ventricular tachycardia status post ablation at [facility] in October 2020.  Dilated nonischemic cardiomyopathy.  Coronary disease status post stent placement in April 2020.  On Brilinta and aspirin.  Chronic systolic congestive heart failure  COVID-19 infection in February 2024.  Post COVID fatigue and lethargy.  Mild reduction in LV systolic function which subsequently normalized.     Past Medical History  He has a past medical history of Abnormal findings on diagnostic imaging of heart and coronary circulation.    Surgical History  He has a past surgical history that includes Other surgical history (03/08/2019); Other surgical history (06/02/2020); and Other surgical history (04/16/2020).     Allergies  Patient has no known allergies.    Social History  He reports that he has never smoked. He has never used smokeless tobacco. He reports current alcohol use. He reports that he does not use drugs.    Family History  No family history on file.    Last Recorded Vitals  Blood pressure 122/71, pulse 59, temperature 36.3 °C (97.4 °F), temperature source Temporal, resp. rate 16, height 1.829 m (6'), weight 86.5 kg (190 lb 9.6 oz), SpO2 98%.    Physical Exam   Constitutional: Well-developed, well-nourished, alert and oriented, no acute distress  Skin: Warm and dry, no lesions, no rashes, no jaundice  HEENT: Normocephalic, atraumatic, EOMI, no scleral icterus, eyes have no redness or swelling or discharge, external inspection of ears and nose is normal, mucous  membranes moist  Neck: Soft, nontender, no mass or adenopathy  Cardiac: Regular rate and rhythm, no murmur  Chest: Patent airway, clear to auscultation, normal breath sounds with good chest expansion, no wheezes or rales or rhonchi noted, thorax symmetric  Abdomen: Nondistended, positive bowel sounds, soft, nontender, no mass.  Left inguinal hernia, reducible but tender.  No right inguinal hernia or femoral hernias palpable.  Rectal: Not performed  Extremities: No injury, no lower extremity edema or calf tenderness  Lymphatic: No cervical adenopathy  Musculoskeletal: Range of motion intact, no joint swelling, normal strength  Neurological: Alert and oriented x3, intact sensory and motor function, no obvious focal neurologic abnormalities, normal gait  Psychological: Appropriate mood and behavior  Patient declined a chaperone       Assessment/Plan   Diagnoses and all orders for this visit:  Left inguinal hernia    Symptomatic, but reducible left inguinal hernia.  Recommend left inguinal herniorrhaphy with mesh either open or robotic assisted laparoscopic.  I again discussed the procedure and risks with the patient. The risks include bleeding, infection, mesh infection, recurrence, injury to surrounding structures such as nerves/blood vessels/intestine/bladder, chronic postop pain, cardiac/pulmonary complications.   If the mesh becomes infected it could require excision.  I have discussed the alternative of hernia repair without mesh and observation.  Because he is on dual antiplatelet therapy, I would recommend open repair of the hernia.  Electronic consent has been obtained  I received a cardiac risk assessment note from Jhony Lao PA-C.  He reports that the patient is cleared for hernia operation.  May hold aspirin/Brilinta for 5 days preop and resume as soon as possible postop.  I discussed this with the patient.  Plan to resume dual antiplatelet therapy 2 days postop.      Alfredito Benavidez MD

## 2024-08-19 NOTE — LETTER
August 19, 2024     Darius Steward MD  [address]  [address]  [facility] [address]    Patient: Spencer Maloney   YOB: 1955   Date of Visit: 8/19/2024       Dear Dr. Darius Steward MD:    Thank you for referring Spencer Maloney to me for evaluation. Below are my notes for this consultation.  If you have questions, please do not hesitate to call me. I look forward to following your patient along with you.       Sincerely,     Alfredito Benavidez MD      CC: No Recipients  ______________________________________________________________________________________    History Of Present Illness  HPI   July 12, 2024  The patient is a 68-year-old male who complains of a left inguinal hernia.  The hernia has been present for at least 5 years.  Initially it was asymptomatic but recently has been causing left inguinal pain.     August 19, 2024  The patient reports no change in his hernia.  No change in his medical history.    Past medical history:  Laparoscopic cholecystectomy with cholangiogram on March 1, 2019 for acute cholecystitis.  Ventricular tachycardia status post ablation at [facility] in October 2020.  Dilated nonischemic cardiomyopathy.  Coronary disease status post stent placement in April 2020.  On Brilinta and aspirin.  Chronic systolic congestive heart failure  COVID-19 infection in February 2024.  Post COVID fatigue and lethargy.  Mild reduction in LV systolic function which subsequently normalized.     Past Medical History  He has a past medical history of Abnormal findings on diagnostic imaging of heart and coronary circulation.    Surgical History  He has a past surgical history that includes Other surgical history (03/08/2019); Other surgical history (06/02/2020); and Other surgical history (04/16/2020).     Allergies  Patient has no known allergies.    Social History  He reports that he has never smoked. He has never used smokeless tobacco. He reports  current alcohol use. He reports that he does not use drugs.    Family History  No family history on file.    Last Recorded Vitals  Blood pressure 122/71, pulse 59, temperature 36.3 °C (97.4 °F), temperature source Temporal, resp. rate 16, height 1.829 m (6'), weight 86.5 kg (190 lb 9.6 oz), SpO2 98%.    Physical Exam   Constitutional: Well-developed, well-nourished, alert and oriented, no acute distress  Skin: Warm and dry, no lesions, no rashes, no jaundice  HEENT: Normocephalic, atraumatic, EOMI, no scleral icterus, eyes have no redness or swelling or discharge, external inspection of ears and nose is normal, mucous membranes moist  Neck: Soft, nontender, no mass or adenopathy  Cardiac: Regular rate and rhythm, no murmur  Chest: Patent airway, clear to auscultation, normal breath sounds with good chest expansion, no wheezes or rales or rhonchi noted, thorax symmetric  Abdomen: Nondistended, positive bowel sounds, soft, nontender, no mass.  Left inguinal hernia, reducible but tender.  No right inguinal hernia or femoral hernias palpable.  Rectal: Not performed  Extremities: No injury, no lower extremity edema or calf tenderness  Lymphatic: No cervical adenopathy  Musculoskeletal: Range of motion intact, no joint swelling, normal strength  Neurological: Alert and oriented x3, intact sensory and motor function, no obvious focal neurologic abnormalities, normal gait  Psychological: Appropriate mood and behavior  Patient declined a chaperone       Assessment/Plan  Diagnoses and all orders for this visit:  Left inguinal hernia    Symptomatic, but reducible left inguinal hernia.  Recommend left inguinal herniorrhaphy with mesh either open or robotic assisted laparoscopic.  I again discussed the procedure and risks with the patient. The risks include bleeding, infection, mesh infection, recurrence, injury to surrounding structures such as nerves/blood vessels/intestine/bladder, chronic postop pain, cardiac/pulmonary  complications.   If the mesh becomes infected it could require excision.  I have discussed the alternative of hernia repair without mesh and observation.  Because he is on dual antiplatelet therapy, I would recommend open repair of the hernia.  Electronic consent has been obtained  I received a cardiac risk assessment note from Jhony Lao PA-C.  He reports that the patient is cleared for hernia operation.  May hold aspirin/Brilinta for 5 days preop and resume as soon as possible postop.  I discussed this with the patient.  Plan to resume dual antiplatelet therapy 2 days postop.      Alfredito Benavidez MD

## 2024-08-19 NOTE — PROGRESS NOTES
History Of Present Illness  HPI   July 12, 2024  The patient is a 68-year-old male who complains of a left inguinal hernia.  The hernia has been present for at least 5 years.  Initially it was asymptomatic but recently has been causing left inguinal pain.     August 19, 2024  The patient reports no change in his hernia.  No change in his medical history.    Past medical history:  Laparoscopic cholecystectomy with cholangiogram on March 1, 2019 for acute cholecystitis.  Ventricular tachycardia status post ablation at [facility] in October 2020.  Dilated nonischemic cardiomyopathy.  Coronary disease status post stent placement in April 2020.  On Brilinta and aspirin.  Chronic systolic congestive heart failure  COVID-19 infection in February 2024.  Post COVID fatigue and lethargy.  Mild reduction in LV systolic function which subsequently normalized.     Past Medical History  He has a past medical history of Abnormal findings on diagnostic imaging of heart and coronary circulation.    Surgical History  He has a past surgical history that includes Other surgical history (03/08/2019); Other surgical history (06/02/2020); and Other surgical history (04/16/2020).     Allergies  Patient has no known allergies.    Social History  He reports that he has never smoked. He has never used smokeless tobacco. He reports current alcohol use. He reports that he does not use drugs.    Family History  No family history on file.    Last Recorded Vitals  Blood pressure 122/71, pulse 59, temperature 36.3 °C (97.4 °F), temperature source Temporal, resp. rate 16, height 1.829 m (6'), weight 86.5 kg (190 lb 9.6 oz), SpO2 98%.    Physical Exam   Constitutional: Well-developed, well-nourished, alert and oriented, no acute distress  Skin: Warm and dry, no lesions, no rashes, no jaundice  HEENT: Normocephalic, atraumatic, EOMI, no scleral icterus, eyes have no redness or swelling or discharge, external inspection of ears and nose is normal, mucous  membranes moist  Neck: Soft, nontender, no mass or adenopathy  Cardiac: Regular rate and rhythm, no murmur  Chest: Patent airway, clear to auscultation, normal breath sounds with good chest expansion, no wheezes or rales or rhonchi noted, thorax symmetric  Abdomen: Nondistended, positive bowel sounds, soft, nontender, no mass.  Left inguinal hernia, reducible but tender.  No right inguinal hernia or femoral hernias palpable.  Rectal: Not performed  Extremities: No injury, no lower extremity edema or calf tenderness  Lymphatic: No cervical adenopathy  Musculoskeletal: Range of motion intact, no joint swelling, normal strength  Neurological: Alert and oriented x3, intact sensory and motor function, no obvious focal neurologic abnormalities, normal gait  Psychological: Appropriate mood and behavior  Patient declined a chaperone       Assessment/Plan   Diagnoses and all orders for this visit:  Left inguinal hernia    Symptomatic, but reducible left inguinal hernia.  Recommend left inguinal herniorrhaphy with mesh either open or robotic assisted laparoscopic.  I again discussed the procedure and risks with the patient. The risks include bleeding, infection, mesh infection, recurrence, injury to surrounding structures such as nerves/blood vessels/intestine/bladder, chronic postop pain, cardiac/pulmonary complications.   If the mesh becomes infected it could require excision.  I have discussed the alternative of hernia repair without mesh and observation.  Because he is on dual antiplatelet therapy, I would recommend open repair of the hernia.  Electronic consent has been obtained  I received a cardiac risk assessment note from Jhony Lao PA-C.  He reports that the patient is cleared for hernia operation.  May hold aspirin/Brilinta for 5 days preop and resume as soon as possible postop.  I discussed this with the patient.  Plan to resume dual antiplatelet therapy 2 days postop.      Alfredito Benavidez MD

## 2024-08-26 ENCOUNTER — ANESTHESIA EVENT (OUTPATIENT)
Dept: OPERATING ROOM | Facility: HOSPITAL | Age: 69
End: 2024-08-26
Payer: MEDICARE

## 2024-08-26 ENCOUNTER — LAB (OUTPATIENT)
Dept: LAB | Facility: LAB | Age: 69
End: 2024-08-26
Payer: MEDICARE

## 2024-08-26 DIAGNOSIS — Z98.890: ICD-10-CM

## 2024-08-26 DIAGNOSIS — R79.1 ABNORMAL COAGULATION TIME: ICD-10-CM

## 2024-08-26 DIAGNOSIS — K40.90 LEFT INGUINAL HERNIA: ICD-10-CM

## 2024-08-26 LAB
ALBUMIN SERPL BCP-MCNC: 4.5 G/DL (ref 3.4–5)
ALP SERPL-CCNC: 64 U/L (ref 33–136)
ALT SERPL W P-5'-P-CCNC: 92 U/L (ref 10–52)
ANION GAP SERPL CALC-SCNC: 14 MMOL/L (ref 10–20)
APTT PPP: 32 SECONDS (ref 27–38)
AST SERPL W P-5'-P-CCNC: 70 U/L (ref 9–39)
BILIRUB SERPL-MCNC: 0.7 MG/DL (ref 0–1.2)
BUN SERPL-MCNC: 22 MG/DL (ref 6–23)
CALCIUM SERPL-MCNC: 9.7 MG/DL (ref 8.6–10.3)
CHLORIDE SERPL-SCNC: 104 MMOL/L (ref 98–107)
CO2 SERPL-SCNC: 25 MMOL/L (ref 21–32)
CREAT SERPL-MCNC: 0.94 MG/DL (ref 0.5–1.3)
EGFRCR SERPLBLD CKD-EPI 2021: 88 ML/MIN/1.73M*2
ERYTHROCYTE [DISTWIDTH] IN BLOOD BY AUTOMATED COUNT: 12.7 % (ref 11.5–14.5)
GLUCOSE SERPL-MCNC: 110 MG/DL (ref 74–99)
HCT VFR BLD AUTO: 45.5 % (ref 41–52)
HGB BLD-MCNC: 15.2 G/DL (ref 13.5–17.5)
INR PPP: 0.9 (ref 0.9–1.1)
MCH RBC QN AUTO: 31.3 PG (ref 26–34)
MCHC RBC AUTO-ENTMCNC: 33.4 G/DL (ref 32–36)
MCV RBC AUTO: 94 FL (ref 80–100)
NRBC BLD-RTO: 0 /100 WBCS (ref 0–0)
PLATELET # BLD AUTO: 262 X10*3/UL (ref 150–450)
POTASSIUM SERPL-SCNC: 4.5 MMOL/L (ref 3.5–5.3)
PROT SERPL-MCNC: 6.6 G/DL (ref 6.4–8.2)
PROTHROMBIN TIME: 10.4 SECONDS (ref 9.8–12.8)
RBC # BLD AUTO: 4.85 X10*6/UL (ref 4.5–5.9)
SODIUM SERPL-SCNC: 138 MMOL/L (ref 136–145)
WBC # BLD AUTO: 5.6 X10*3/UL (ref 4.4–11.3)

## 2024-08-26 PROCEDURE — 80053 COMPREHEN METABOLIC PANEL: CPT

## 2024-08-26 PROCEDURE — 85610 PROTHROMBIN TIME: CPT

## 2024-08-26 PROCEDURE — 36415 COLL VENOUS BLD VENIPUNCTURE: CPT

## 2024-08-26 PROCEDURE — 85730 THROMBOPLASTIN TIME PARTIAL: CPT

## 2024-08-26 PROCEDURE — 85027 COMPLETE CBC AUTOMATED: CPT

## 2024-08-27 ENCOUNTER — HOSPITAL ENCOUNTER (OUTPATIENT)
Facility: HOSPITAL | Age: 69
Setting detail: OUTPATIENT SURGERY
Discharge: HOME | End: 2024-08-27
Attending: SURGERY | Admitting: SURGERY
Payer: MEDICARE

## 2024-08-27 ENCOUNTER — ANESTHESIA (OUTPATIENT)
Dept: OPERATING ROOM | Facility: HOSPITAL | Age: 69
End: 2024-08-27
Payer: MEDICARE

## 2024-08-27 VITALS
HEART RATE: 57 BPM | SYSTOLIC BLOOD PRESSURE: 113 MMHG | WEIGHT: 190.7 LBS | HEIGHT: 72 IN | BODY MASS INDEX: 25.83 KG/M2 | RESPIRATION RATE: 18 BRPM | OXYGEN SATURATION: 100 % | DIASTOLIC BLOOD PRESSURE: 56 MMHG | TEMPERATURE: 97 F

## 2024-08-27 DIAGNOSIS — K40.90 LEFT INGUINAL HERNIA: Primary | ICD-10-CM

## 2024-08-27 DIAGNOSIS — R79.1 ABNORMAL COAGULATION TIME: ICD-10-CM

## 2024-08-27 DIAGNOSIS — I50.22 CHRONIC SYSTOLIC CONGESTIVE HEART FAILURE (MULTI): ICD-10-CM

## 2024-08-27 DIAGNOSIS — Z98.890: ICD-10-CM

## 2024-08-27 PROCEDURE — 2780000003 HC OR 278 NO HCPCS: Performed by: SURGERY

## 2024-08-27 PROCEDURE — 2500000004 HC RX 250 GENERAL PHARMACY W/ HCPCS (ALT 636 FOR OP/ED): Mod: JZ | Performed by: SURGERY

## 2024-08-27 PROCEDURE — 3600000003 HC OR TIME - INITIAL BASE CHARGE - PROCEDURE LEVEL THREE: Performed by: SURGERY

## 2024-08-27 PROCEDURE — 3700000001 HC GENERAL ANESTHESIA TIME - INITIAL BASE CHARGE: Performed by: SURGERY

## 2024-08-27 PROCEDURE — 49505 PRP I/HERN INIT REDUC >5 YR: CPT | Performed by: SURGERY

## 2024-08-27 PROCEDURE — 2500000004 HC RX 250 GENERAL PHARMACY W/ HCPCS (ALT 636 FOR OP/ED): Performed by: ANESTHESIOLOGIST ASSISTANT

## 2024-08-27 PROCEDURE — 7100000009 HC PHASE TWO TIME - INITIAL BASE CHARGE: Performed by: SURGERY

## 2024-08-27 PROCEDURE — C1781 MESH (IMPLANTABLE): HCPCS | Performed by: SURGERY

## 2024-08-27 PROCEDURE — 93005 ELECTROCARDIOGRAM TRACING: CPT | Mod: 59

## 2024-08-27 PROCEDURE — 93010 ELECTROCARDIOGRAM REPORT: CPT | Performed by: INTERNAL MEDICINE

## 2024-08-27 PROCEDURE — 2500000005 HC RX 250 GENERAL PHARMACY W/O HCPCS: Performed by: SURGERY

## 2024-08-27 PROCEDURE — 3700000002 HC GENERAL ANESTHESIA TIME - EACH INCREMENTAL 1 MINUTE: Performed by: SURGERY

## 2024-08-27 PROCEDURE — 2500000004 HC RX 250 GENERAL PHARMACY W/ HCPCS (ALT 636 FOR OP/ED): Performed by: ANESTHESIOLOGY

## 2024-08-27 PROCEDURE — 2500000005 HC RX 250 GENERAL PHARMACY W/O HCPCS: Performed by: ANESTHESIOLOGIST ASSISTANT

## 2024-08-27 PROCEDURE — 7100000010 HC PHASE TWO TIME - EACH INCREMENTAL 1 MINUTE: Performed by: SURGERY

## 2024-08-27 PROCEDURE — 2720000007 HC OR 272 NO HCPCS: Performed by: SURGERY

## 2024-08-27 PROCEDURE — 3600000008 HC OR TIME - EACH INCREMENTAL 1 MINUTE - PROCEDURE LEVEL THREE: Performed by: SURGERY

## 2024-08-27 DEVICE — BARD SOFT MESH
Type: IMPLANTABLE DEVICE | Site: INGUINAL | Status: FUNCTIONAL
Brand: BARD SOFT MESH

## 2024-08-27 RX ORDER — ONDANSETRON HYDROCHLORIDE 2 MG/ML
4 INJECTION, SOLUTION INTRAVENOUS ONCE AS NEEDED
Status: DISCONTINUED | OUTPATIENT
Start: 2024-08-27 | End: 2024-08-27 | Stop reason: HOSPADM

## 2024-08-27 RX ORDER — KETAMINE HCL IN NACL, ISO-OSM 100MG/10ML
SYRINGE (ML) INJECTION AS NEEDED
Status: DISCONTINUED | OUTPATIENT
Start: 2024-08-27 | End: 2024-08-27

## 2024-08-27 RX ORDER — METOPROLOL TARTRATE 1 MG/ML
5 INJECTION, SOLUTION INTRAVENOUS ONCE
Status: DISCONTINUED | OUTPATIENT
Start: 2024-08-27 | End: 2024-08-27 | Stop reason: HOSPADM

## 2024-08-27 RX ORDER — MORPHINE SULFATE 2 MG/ML
2 INJECTION, SOLUTION INTRAMUSCULAR; INTRAVENOUS EVERY 5 MIN PRN
Status: DISCONTINUED | OUTPATIENT
Start: 2024-08-27 | End: 2024-08-27 | Stop reason: HOSPADM

## 2024-08-27 RX ORDER — BUPIVACAINE HYDROCHLORIDE 5 MG/ML
INJECTION, SOLUTION EPIDURAL; INTRACAUDAL AS NEEDED
Status: DISCONTINUED | OUTPATIENT
Start: 2024-08-27 | End: 2024-08-27 | Stop reason: HOSPADM

## 2024-08-27 RX ORDER — SCOLOPAMINE TRANSDERMAL SYSTEM 1 MG/1
1 PATCH, EXTENDED RELEASE TRANSDERMAL ONCE
Status: DISCONTINUED | OUTPATIENT
Start: 2024-08-27 | End: 2024-08-27 | Stop reason: HOSPADM

## 2024-08-27 RX ORDER — GABAPENTIN 300 MG/1
300 CAPSULE ORAL 3 TIMES DAILY PRN
Qty: 15 CAPSULE | Refills: 0 | Status: SHIPPED | OUTPATIENT
Start: 2024-08-27

## 2024-08-27 RX ORDER — ALBUTEROL SULFATE 0.83 MG/ML
2.5 SOLUTION RESPIRATORY (INHALATION) ONCE AS NEEDED
Status: DISCONTINUED | OUTPATIENT
Start: 2024-08-27 | End: 2024-08-27 | Stop reason: HOSPADM

## 2024-08-27 RX ORDER — PHENYLEPHRINE HCL IN 0.9% NACL 1 MG/10 ML
SYRINGE (ML) INTRAVENOUS AS NEEDED
Status: DISCONTINUED | OUTPATIENT
Start: 2024-08-27 | End: 2024-08-27

## 2024-08-27 RX ORDER — OXYCODONE HYDROCHLORIDE 5 MG/1
5 TABLET ORAL EVERY 6 HOURS PRN
Qty: 8 TABLET | Refills: 0 | Status: SHIPPED | OUTPATIENT
Start: 2024-08-27

## 2024-08-27 RX ORDER — HYDRALAZINE HYDROCHLORIDE 20 MG/ML
10 INJECTION INTRAMUSCULAR; INTRAVENOUS EVERY 30 MIN PRN
Status: DISCONTINUED | OUTPATIENT
Start: 2024-08-27 | End: 2024-08-27 | Stop reason: HOSPADM

## 2024-08-27 RX ORDER — LABETALOL HYDROCHLORIDE 5 MG/ML
10 INJECTION, SOLUTION INTRAVENOUS ONCE AS NEEDED
Status: DISCONTINUED | OUTPATIENT
Start: 2024-08-27 | End: 2024-08-27 | Stop reason: HOSPADM

## 2024-08-27 RX ORDER — PROPOFOL 10 MG/ML
INJECTION, EMULSION INTRAVENOUS AS NEEDED
Status: DISCONTINUED | OUTPATIENT
Start: 2024-08-27 | End: 2024-08-27

## 2024-08-27 RX ORDER — MIDAZOLAM HYDROCHLORIDE 1 MG/ML
1 INJECTION, SOLUTION INTRAMUSCULAR; INTRAVENOUS ONCE AS NEEDED
Status: DISCONTINUED | OUTPATIENT
Start: 2024-08-27 | End: 2024-08-27 | Stop reason: HOSPADM

## 2024-08-27 RX ORDER — DIPHENHYDRAMINE HYDROCHLORIDE 50 MG/ML
25 INJECTION INTRAMUSCULAR; INTRAVENOUS ONCE AS NEEDED
Status: DISCONTINUED | OUTPATIENT
Start: 2024-08-27 | End: 2024-08-27 | Stop reason: HOSPADM

## 2024-08-27 RX ORDER — FAMOTIDINE 10 MG/ML
20 INJECTION INTRAVENOUS ONCE
Status: DISCONTINUED | OUTPATIENT
Start: 2024-08-27 | End: 2024-08-27 | Stop reason: HOSPADM

## 2024-08-27 RX ORDER — LIDOCAINE HYDROCHLORIDE 10 MG/ML
INJECTION INFILTRATION; PERINEURAL AS NEEDED
Status: DISCONTINUED | OUTPATIENT
Start: 2024-08-27 | End: 2024-08-27

## 2024-08-27 RX ORDER — CEFAZOLIN SODIUM 2 G/100ML
2 INJECTION, SOLUTION INTRAVENOUS ONCE
Status: COMPLETED | OUTPATIENT
Start: 2024-08-27 | End: 2024-08-27

## 2024-08-27 RX ORDER — HYDROMORPHONE HYDROCHLORIDE 1 MG/ML
1 INJECTION, SOLUTION INTRAMUSCULAR; INTRAVENOUS; SUBCUTANEOUS EVERY 5 MIN PRN
Status: DISCONTINUED | OUTPATIENT
Start: 2024-08-27 | End: 2024-08-27 | Stop reason: HOSPADM

## 2024-08-27 RX ORDER — MIDAZOLAM HYDROCHLORIDE 1 MG/ML
INJECTION, SOLUTION INTRAMUSCULAR; INTRAVENOUS AS NEEDED
Status: DISCONTINUED | OUTPATIENT
Start: 2024-08-27 | End: 2024-08-27

## 2024-08-27 RX ORDER — SODIUM CHLORIDE, SODIUM LACTATE, POTASSIUM CHLORIDE, CALCIUM CHLORIDE 600; 310; 30; 20 MG/100ML; MG/100ML; MG/100ML; MG/100ML
100 INJECTION, SOLUTION INTRAVENOUS CONTINUOUS
Status: DISCONTINUED | OUTPATIENT
Start: 2024-08-27 | End: 2024-08-27 | Stop reason: HOSPADM

## 2024-08-27 RX ORDER — FENTANYL CITRATE 50 UG/ML
INJECTION, SOLUTION INTRAMUSCULAR; INTRAVENOUS AS NEEDED
Status: DISCONTINUED | OUTPATIENT
Start: 2024-08-27 | End: 2024-08-27

## 2024-08-27 RX ORDER — ACETAMINOPHEN 325 MG/1
975 TABLET ORAL ONCE
Status: COMPLETED | OUTPATIENT
Start: 2024-08-27 | End: 2024-08-27

## 2024-08-27 RX ORDER — MEPERIDINE HYDROCHLORIDE 25 MG/ML
12.5 INJECTION INTRAMUSCULAR; INTRAVENOUS; SUBCUTANEOUS EVERY 10 MIN PRN
Status: DISCONTINUED | OUTPATIENT
Start: 2024-08-27 | End: 2024-08-27 | Stop reason: HOSPADM

## 2024-08-27 RX ORDER — LIDOCAINE HYDROCHLORIDE 10 MG/ML
INJECTION INFILTRATION; PERINEURAL AS NEEDED
Status: DISCONTINUED | OUTPATIENT
Start: 2024-08-27 | End: 2024-08-27 | Stop reason: HOSPADM

## 2024-08-27 SDOH — HEALTH STABILITY: MENTAL HEALTH: CURRENT SMOKER: 0

## 2024-08-27 ASSESSMENT — PAIN SCALES - GENERAL
PAINLEVEL_OUTOF10: 0 - NO PAIN
PAINLEVEL_OUTOF10: 6

## 2024-08-27 ASSESSMENT — COLUMBIA-SUICIDE SEVERITY RATING SCALE - C-SSRS
2. HAVE YOU ACTUALLY HAD ANY THOUGHTS OF KILLING YOURSELF?: NO
6. HAVE YOU EVER DONE ANYTHING, STARTED TO DO ANYTHING, OR PREPARED TO DO ANYTHING TO END YOUR LIFE?: NO
1. IN THE PAST MONTH, HAVE YOU WISHED YOU WERE DEAD OR WISHED YOU COULD GO TO SLEEP AND NOT WAKE UP?: NO

## 2024-08-27 ASSESSMENT — PAIN - FUNCTIONAL ASSESSMENT: PAIN_FUNCTIONAL_ASSESSMENT: 0-10

## 2024-08-27 NOTE — ANESTHESIA PREPROCEDURE EVALUATION
Patient: Spencer Maloney    Procedure Information       Anesthesia Start Date/Time: 08/27/24 0730    Procedure: LEFT INGUINAL HERNIA OPEN REPAIR WITH MESH (Left)    Location: [facility] / [facility]    Surgeons: Alfredito Benavidez MD            Relevant Problems   Anesthesia   (-) Difficult intubation   (-) PONV (postoperative nausea and vomiting)      Cardiac   (+) Angina pectoris (CMS-HCC)   (+) Chronic systolic congestive heart failure (Multi)   (+) Coronary atherosclerosis   (+) PVC (premature ventricular contraction)      Pulmonary   (+) Exertional dyspnea   (+) Pneumonia      GI   (+) Gastroesophageal reflux disease      Musculoskeletal   (+) Spondylosis of lumbar region without myelopathy or radiculopathy       Clinical information reviewed:    Allergies  Meds               NPO Detail:  NPO/Void Status  Date of Last Liquid: 08/27/24  Time of Last Liquid: 0315  Date of Last Solid: 08/26/24  Time of Last Solid: 2030         Physical Exam    Airway  Mallampati: II  TM distance: >3 FB  Neck ROM: full     Cardiovascular - normal exam  Rhythm: regular  Rate: normal     Dental    Pulmonary - normal exam     Abdominal            Anesthesia Plan    History of general anesthesia?: yes  History of complications of general anesthesia?: no    ASA 3     MAC     The patient is not a current smoker.  Education provided regarding risk of obstructive sleep apnea.  intravenous induction   Postoperative administration of opioids is intended.  Anesthetic plan and risks discussed with patient.    Plan discussed with CRNA, CAA and attending.

## 2024-08-27 NOTE — OP NOTE
LEFT INGUINAL HERNIA OPEN REPAIR WITH MESH (L) Operative Note     Date: 2024  OR Location: [facility]    Name: Spencer Maloney, : 1955, Age: 68 y.o., MRN: [id number], Sex: male    Diagnosis  Pre-op Diagnosis      * Left inguinal hernia [K40.90] Post-op Diagnosis     * Left inguinal hernia [K40.90]     Procedures  LEFT INGUINAL HERNIA OPEN REPAIR WITH MESH  16404 - [facility] 1ST INGUN HRNA AGE 5 YRS/> REDUCIBLE      Surgeons      * Alfredito Benavidez - Primary    Resident/Fellow/Other Assistant:  Surgeons and Role:  * No surgeons found with a matching role *    Procedure Summary  Anesthesia: Monitor Anesthesia Care  ASA: III  Anesthesia Staff: Anesthesiologist: Michael Dominique MD  C-AA: BAHMAN Talamantes  Estimated Blood Loss: 0mL  Intra-op Medications:   Administrations occurring from 0730 to 0930 on 24:   Medication Name Total Dose   lidocaine (Xylocaine) 10 mg/mL (1 %) injection 18 mL   bupivacaine PF (Marcaine) 0.5 % (5 mg/mL) injection 10 mL   ceFAZolin (Ancef) 2 g in dextrose (iso)  mL 2 g              Anesthesia Record               Intraprocedure I/O Totals       None           Specimen:   ID Type Source Tests Collected by Time   1 : cord lipoma Tissue LIPOMA OF SPERMATIC CORD SURGICAL PATHOLOGY EXAM Alfredito Benavidez MD 2024 0808        Staff:   Scrub Person: Candace  Circulator: Margarita  Relief Circulator: Yissel         Drains and/or Catheters: * None in log *    Tourniquet Times:         Implants:  Implants       Type Name Action Serial No.      Surgical Mesh Sling Implant MESH, SURGICAL, SOFT, POLYPROPYLENE 4 X 6 - [id number] Implanted               Findings: Left direct inguinal hernia and moderate-sized cord lipoma    Indications: Spencer Maloney is an 68 y.o. male who is having surgery for Left inguinal hernia [K40.90].     The patient was seen in the preoperative area. The risks, benefits, complications, treatment options, non-operative alternatives, expected recovery and outcomes  were discussed with the patient. The possibilities of reaction to medication, pulmonary aspiration, injury to surrounding structures, bleeding, recurrent infection, the need for additional procedures, failure to diagnose a condition, and creating a complication requiring transfusion or operation were discussed with the patient. The patient concurred with the proposed plan, giving informed consent.  The site of surgery was properly noted/marked if necessary per policy. The patient has been actively warmed in preoperative area. Preoperative antibiotics have been ordered and given within 1 hours of incision. Venous thrombosis prophylaxis have been ordered including bilateral sequential compression devices    Procedure Details: Following informed consent the patient was taken to the operating room and placed in supine position.  A huddle was performed.  The patient was given MAC anesthetic.  Sequential compression devices were in place and functioning.  The patient received Ancef IV in the operating room.  The abdomen was prepped and draped in sterile fashion.  A timeout was performed.  Following infiltration of local anesthetic a [left] inguinal incision was made along the skin lines and extended through the subcutaneous tissue.  The external oblique aponeurosis was incised along the lines of its fibers to the external inguinal ring medially.  The ilioinguinal nerve was identified and carefully preserved.  The spermatic cord was freed from surrounding tissue and a Penrose drain placed around it for retraction.  The patient had a direct inguinal hernia.  The hernia was reduced and imbricated reapproximating the transversalis fascia with a running 2-0 Prolene suture.  The anteromedial portion of the spermatic cord was dissected.  The patient was found to have [a moderate-sized cord lipoma which was freed from surrounding tissue.  The cord lipoma was clamped at its base divided and ligated with a 2-0 Vicryl tie.] .  A 10  x 15 cm diameter polypropylene mesh was cut to the appropriate size and secured circumferentially with 2-0 Prolene suture.  A running suture was used to approximate the mesh to Poupart's ligament beginning medial to the pubic tubercle and extending lateral to the internal inguinal ring.  Laterally the mesh was divided and the 2 legs of the mesh placed around the spermatic cord at the internal inguinal ring.  Medially and superiorly the mesh was secured to the internal oblique and transversus abdominis with interrupted sutures.  Laterally the 2 legs of the mesh were secured to each other and to Poupart's ligament with interrupted sutures.  The external oblique aponeurosis was reapproximated with a running 3-0 Vicryl suture.  Half percent plain Marcaine was infiltrated.  The subcutaneous tissue was closed with interrupted 3-0 chromic sutures.  Skin was closed with a running 4-0 Vicryl subcuticular stitch.  Dressings were placed and the patient was taken to the recovery room in satisfactory condition having tolerated the procedure well.  Counts were correct.  Complications:  None; patient tolerated the procedure well.    Disposition: PACU - hemodynamically stable.  Condition: stable         Alfredito Benavidez  Phone Number: [phone]

## 2024-08-27 NOTE — ANESTHESIA POSTPROCEDURE EVALUATION
Patient: Spencer Maloney    Procedure Summary       Date: 08/27/24 Room / Location: [facility] 08 / [facility]    Anesthesia Start: 0730 Anesthesia Stop: 0856    Procedure: LEFT INGUINAL HERNIA OPEN REPAIR WITH MESH (Left) Diagnosis:       Left inguinal hernia      (Left inguinal hernia [K40.90])    Surgeons: Alfredito Benavidez MD Responsible Provider: Michael Dominique MD    Anesthesia Type: MAC ASA Status: 3            Anesthesia Type: MAC    Vitals Value Taken Time   BP 87/42 08/27/24 0856   Temp 36.1 08/27/24 0856   Pulse 64 08/27/24 0855   Resp 14 08/27/24 0856   SpO2 99 % 08/27/24 0855   Vitals shown include unfiled device data.    Anesthesia Post Evaluation    Patient location during evaluation: PACU  Patient participation: waiting for patient participation  Level of consciousness: responsive to verbal stimuli  Pain management: adequate  Airway patency: patent  Cardiovascular status: acceptable  Respiratory status: acceptable  Hydration status: acceptable  Postoperative Nausea and Vomiting: none      No notable events documented.

## 2024-08-31 LAB
ATRIAL RATE: 58 BPM
P AXIS: 59 DEGREES
P OFFSET: 166 MS
P ONSET: 105 MS
PR INTERVAL: 232 MS
Q ONSET: 221 MS
QRS COUNT: 9 BEATS
QRS DURATION: 102 MS
QT INTERVAL: 404 MS
QTC CALCULATION(BAZETT): 396 MS
QTC FREDERICIA: 399 MS
R AXIS: -28 DEGREES
T AXIS: 65 DEGREES
T OFFSET: 423 MS
VENTRICULAR RATE: 58 BPM

## 2024-09-02 DIAGNOSIS — I50.22 CHRONIC SYSTOLIC CONGESTIVE HEART FAILURE (MULTI): ICD-10-CM

## 2024-09-02 DIAGNOSIS — I25.118 ATHEROSCLEROSIS OF CORONARY ARTERY WITH OTHER FORM OF ANGINA PECTORIS, UNSPECIFIED VESSEL OR LESION TYPE, UNSPECIFIED WHETHER NATIVE OR TRANSPLANTED HEART (CMS-HCC): ICD-10-CM

## 2024-09-05 RX ORDER — LISINOPRIL 10 MG/1
10 TABLET ORAL DAILY
Qty: 90 TABLET | Refills: 3 | Status: SHIPPED | OUTPATIENT
Start: 2024-09-05

## 2024-09-05 RX ORDER — METOPROLOL SUCCINATE 25 MG/1
25 TABLET, EXTENDED RELEASE ORAL DAILY
Qty: 90 TABLET | Refills: 1 | Status: SHIPPED | OUTPATIENT
Start: 2024-09-05

## 2024-09-10 PROBLEM — I47.29 VENTRICULAR TACHYCARDIA, MONOMORPHIC (MULTI): Chronic | Status: ACTIVE | Noted: 2023-11-28

## 2024-09-10 PROBLEM — I50.22 CHRONIC SYSTOLIC CONGESTIVE HEART FAILURE (MULTI): Chronic | Status: ACTIVE | Noted: 2023-03-01

## 2024-09-11 ENCOUNTER — APPOINTMENT (OUTPATIENT)
Dept: CARDIOLOGY | Facility: CLINIC | Age: 69
End: 2024-09-11
Payer: MEDICARE

## 2024-09-11 VITALS
WEIGHT: 186 LBS | HEIGHT: 73 IN | BODY MASS INDEX: 24.65 KG/M2 | DIASTOLIC BLOOD PRESSURE: 70 MMHG | HEART RATE: 76 BPM | SYSTOLIC BLOOD PRESSURE: 112 MMHG

## 2024-09-11 DIAGNOSIS — I50.22 CHRONIC SYSTOLIC CONGESTIVE HEART FAILURE (MULTI): ICD-10-CM

## 2024-09-11 DIAGNOSIS — I25.118 ATHEROSCLEROSIS OF CORONARY ARTERY WITH OTHER FORM OF ANGINA PECTORIS, UNSPECIFIED VESSEL OR LESION TYPE, UNSPECIFIED WHETHER NATIVE OR TRANSPLANTED HEART (CMS-HCC): ICD-10-CM

## 2024-09-11 DIAGNOSIS — I47.29 VENTRICULAR TACHYCARDIA, MONOMORPHIC (MULTI): Primary | ICD-10-CM

## 2024-09-11 DIAGNOSIS — I49.3 PVC (PREMATURE VENTRICULAR CONTRACTION): ICD-10-CM

## 2024-09-11 LAB
LABORATORY COMMENT REPORT: NORMAL
PATH REPORT.FINAL DX SPEC: NORMAL
PATH REPORT.GROSS SPEC: NORMAL
PATH REPORT.RELEVANT HX SPEC: NORMAL
PATH REPORT.TOTAL CANCER: NORMAL

## 2024-09-11 PROCEDURE — 93005 ELECTROCARDIOGRAM TRACING: CPT | Performed by: INTERNAL MEDICINE

## 2024-09-11 PROCEDURE — 99213 OFFICE O/P EST LOW 20 MIN: CPT | Performed by: INTERNAL MEDICINE

## 2024-09-11 PROCEDURE — 93010 ELECTROCARDIOGRAM REPORT: CPT | Performed by: INTERNAL MEDICINE

## 2024-09-11 PROCEDURE — 1036F TOBACCO NON-USER: CPT | Performed by: INTERNAL MEDICINE

## 2024-09-11 PROCEDURE — 1159F MED LIST DOCD IN RCRD: CPT | Performed by: INTERNAL MEDICINE

## 2024-09-11 PROCEDURE — 3008F BODY MASS INDEX DOCD: CPT | Performed by: INTERNAL MEDICINE

## 2024-09-11 RX ORDER — TICAGRELOR 90 MG/1
90 TABLET ORAL 2 TIMES DAILY
Qty: 180 TABLET | Refills: 3 | Status: SHIPPED | OUTPATIENT
Start: 2024-09-11 | End: 2025-09-11

## 2024-09-11 RX ORDER — METOPROLOL SUCCINATE 25 MG/1
25 TABLET, EXTENDED RELEASE ORAL DAILY
Qty: 90 TABLET | Refills: 3 | Status: SHIPPED | OUTPATIENT
Start: 2024-09-11 | End: 2025-09-11

## 2024-09-11 NOTE — PROGRESS NOTES
"History Of Present Illness:      This is a 68-year-old male with a history of ventricular tachycardia.  He presents for a follow-up evaluation.  No complaints of palpitations, chest pain, or shortness of breath.    Review of Systems  Other review of systems negative     Last Recorded Vitals:      8/27/2024    10:40 AM 8/27/2024    10:45 AM 8/27/2024    10:50 AM 8/27/2024    10:55 AM 8/27/2024    11:00 AM 8/27/2024    11:15 AM 9/11/2024    10:20 AM   Vitals   Systolic  125   120 113 112   Diastolic  59   59 56 70   Heart Rate 52 62 54 55 55 57 76   Resp  18   18 18    Height (in)       1.854 m (6' 1\")   Weight (lb)       186   BMI       24.54 kg/m2   BSA (m2)       2.08 m2   Visit Report       Report     Allergies:  Patient has no known allergies.    Outpatient Medications:  Current Outpatient Medications   Medication Instructions    aspirin 81 mg EC tablet 1 tablet, oral, Daily    atorvastatin (LIPITOR) 20 mg, oral, Daily    Brilinta 90 mg, oral, 2 times daily    gabapentin (NEURONTIN) 300 mg, oral, 3 times daily PRN    lisinopril 10 mg, oral, Daily    metoprolol succinate XL (TOPROL-XL) 25 mg, oral, Daily    oxyCODONE (ROXICODONE) 5 mg, oral, Every 6 hours PRN     Physical Exam:    General Appearance:  Alert, oriented, no distress  Skin:  Warm and dry  Head and Neck:  No elevation of JVP, no carotid bruits  Cardiac Exam:  Rhythm is regular, S1 and S2 are normal, grade 1/6 systolic murmur at the lower left sternal border  Lungs:  Clear to auscultation  Extremities:  no edema  Neurologic:  No focal deficits  Psychiatric:  Appropriate mood and behavior    Cardiology Tests:  I have personally review the diagnostic cardiac testing and my interpretation is as follows:    Echocardiogram July 2022: Ejection fraction 45 to 50% with mild aortic valve regurgitation  Echocardiogram November 2023: Ejection fraction 40 to 45% with mild aortic valve regurgitation and mild to moderate mitral valve regurgitation.  Echocardiogram " April 2024:  EF 50-55%, mod AR    Assessment/Plan   Problem List Items Addressed This Visit             ICD-10-CM    Chronic systolic congestive heart failure (Multi) (Chronic) I50.22    Relevant Medications    Brilinta 90 mg tablet    metoprolol succinate XL (Toprol-XL) 25 mg 24 hr tablet    Other Relevant Orders    ECG 12 lead (Clinic Performed) (Completed)    Coronary atherosclerosis I25.10    Relevant Medications    Brilinta 90 mg tablet    metoprolol succinate XL (Toprol-XL) 25 mg 24 hr tablet    PVC (premature ventricular contraction) (Chronic) I49.3     1.  PVCs: This patient has a history of frequent PVCs with nonsustained ventricular tachycardia.  He underwent a VT ablation at [facility] in October 2020 and the PVC burden has improved dramatically.  He currently has occasional PVCs but has been asymptomatic.    2.  Coronary artery disease: History of single-vessel CAD with stent placement in the LAD in April 2020.  Continue Brilinta.  Patient was unable to tolerate Plavix.    3.  Dilated cardiomyopathy: The patient has a dilated nonischemic cardiomyopathy and previously had an ejection fraction as low as 20 to 25%.  The most recent echocardiogram from April 2024 shows an ejection fraction of 50 to 55%.         Relevant Medications    Brilinta 90 mg tablet    metoprolol succinate XL (Toprol-XL) 25 mg 24 hr tablet    Ventricular tachycardia, monomorphic (Multi) - Primary (Chronic) I47.29    Relevant Medications    Brilinta 90 mg tablet    metoprolol succinate XL (Toprol-XL) 25 mg 24 hr tablet    Other Relevant Orders    ECG 12 lead (Clinic Performed) (Completed)     James C Ramicone, DO

## 2024-09-13 PROBLEM — I49.3 PVC (PREMATURE VENTRICULAR CONTRACTION): Chronic | Status: ACTIVE | Noted: 2023-11-28

## 2024-09-13 NOTE — ASSESSMENT & PLAN NOTE
1.  PVCs: This patient has a history of frequent PVCs with nonsustained ventricular tachycardia.  He underwent a VT ablation at [facility] in October 2020 and the PVC burden has improved dramatically.  He currently has occasional PVCs but has been asymptomatic.    2.  Coronary artery disease: History of single-vessel CAD with stent placement in the LAD in April 2020.  Continue Brilinta.  Patient was unable to tolerate Plavix.    3.  Dilated cardiomyopathy: The patient has a dilated nonischemic cardiomyopathy and previously had an ejection fraction as low as 20 to 25%.  The most recent echocardiogram from April 2024 shows an ejection fraction of 50 to 55%.

## 2024-09-16 NOTE — PROGRESS NOTES
"History Of Present Illness  Spencer Maloney is a 68 y.o. male status post open repair of left direct inguinal hernia with mesh and excision of a moderate-sized cord lipoma on August 27, 2024.  No complaints regarding the operation.  The patient return to normal activity in 5 or 6 days.  He took 5 oxycodone tablets.    Last Recorded Vitals  Blood pressure 118/72, pulse 64, temperature 36.9 °C (98.4 °F), temperature source Temporal, resp. rate 16, height 1.854 m (6' 1\"), weight 86.2 kg (190 lb).    Physical Exam  General: Well-developed, well-nourished, no acute distress, alert and oriented  Wound: Intact, no erythema or signs of infection      Relevant Results  Surgical Pathology Exam: [id number]  Order: [id number]   Collected 8/27/2024 08:08       Status: Final result       Visible to patient: No (inaccessible in [facility])       Dx: Left inguinal hernia    0 Result Notes       Component  Resulting Agency   FINAL DIAGNOSIS   A.  TISSUE DESIGNATED \"CORD LIPOMA\":  --MATURE ADIPOSE TISSUE.   Electronically signed by Timothy Naranjo MD on 9/11/2024 at 1208      [facility]   By the signature on this report, the individual or group listed as making the Final Interpretation/Diagnosis certifies that they have reviewed this case.    Clinical History  [facility]   Pre-op diagnosis:  Left inguinal hernia [K40.90]   Gross Description  [facility]   A: Received in formalin, labeled with the patient's name and hospital number and \"cord lipoma\", is a segment of yellow fibroadipose soft tissue measuring 5.5 x 4.7 x 2.3 cm and weighing 19.4 grams.  The specimen is inked.  Serial cross sections reveal a yellow homogeneous cut surface. Representative sections are submitted in 4 cassettes.  [facility]              Specimen Collected: 08/27/24 08:08 Last Resulted: 09/11/24 12:08           Assessment/Plan   Diagnoses and all orders for this visit:  Postop check  Recovering well.  Follow-up as needed.      Alfredito Benavidez MD  "

## 2024-09-18 ENCOUNTER — APPOINTMENT (OUTPATIENT)
Dept: SURGERY | Facility: CLINIC | Age: 69
End: 2024-09-18
Payer: MEDICARE

## 2024-09-18 VITALS
SYSTOLIC BLOOD PRESSURE: 118 MMHG | RESPIRATION RATE: 16 BRPM | HEIGHT: 73 IN | HEART RATE: 64 BPM | WEIGHT: 190 LBS | BODY MASS INDEX: 25.18 KG/M2 | DIASTOLIC BLOOD PRESSURE: 72 MMHG | TEMPERATURE: 98.4 F

## 2024-09-18 DIAGNOSIS — Z09 POSTOP CHECK: Primary | ICD-10-CM

## 2024-09-18 PROCEDURE — 99024 POSTOP FOLLOW-UP VISIT: CPT | Performed by: SURGERY

## 2024-09-18 PROCEDURE — 1036F TOBACCO NON-USER: CPT | Performed by: SURGERY

## 2024-09-18 PROCEDURE — 1159F MED LIST DOCD IN RCRD: CPT | Performed by: SURGERY

## 2024-09-18 PROCEDURE — 3008F BODY MASS INDEX DOCD: CPT | Performed by: SURGERY

## 2024-09-18 PROCEDURE — 1126F AMNT PAIN NOTED NONE PRSNT: CPT | Performed by: SURGERY

## 2024-09-18 ASSESSMENT — PAIN SCALES - GENERAL: PAINLEVEL: 0-NO PAIN

## 2024-09-18 NOTE — LETTER
"September 18, 2024     Darius Steward MD  [id number]  [address]  [place] [place] [facility] [address]    Patient: Spencer Maloney   YOB: 1955   Date of Visit: 9/18/2024       Dear Dr. Darius Steward MD:    Thank you for referring Spencer Maloney to me for evaluation. Below are my notes for this consultation.  If you have questions, please do not hesitate to call me. I look forward to following your patient along with you.       Sincerely,     Alfredito Benavidez MD      CC: No Recipients  ______________________________________________________________________________________    History Of Present Illness  Spencer Maloney is a 68 y.o. male status post open repair of left direct inguinal hernia with mesh and excision of a moderate-sized cord lipoma on August 27, 2024.  No complaints regarding the operation.  The patient return to normal activity in 5 or 6 days.  He took 5 oxycodone tablets.    Last Recorded Vitals  Blood pressure 118/72, pulse 64, temperature 36.9 °C (98.4 °F), temperature source Temporal, resp. rate 16, height 1.854 m (6' 1\"), weight 86.2 kg (190 lb).    Physical Exam  General: Well-developed, well-nourished, no acute distress, alert and oriented  Wound: Intact, no erythema or signs of infection      Relevant Results  Surgical Pathology Exam: [id number]  Order: [id number]   Collected 8/27/2024 08:08       Status: Final result       Visible to patient: No (inaccessible in [facility])       Dx: Left inguinal hernia    0 Result Notes       Component  Resulting Agency   FINAL DIAGNOSIS   A.  TISSUE DESIGNATED \"CORD LIPOMA\":  --MATURE ADIPOSE TISSUE.   Electronically signed by Timothy Naranjo MD on 9/11/2024 at 1208      [facility]   By the signature on this report, the individual or group listed as making the Final Interpretation/Diagnosis certifies that they have reviewed this case.    Clinical History  [facility]   Pre-op diagnosis:  Left inguinal hernia [K40.90]   Gross " "Description  [facility]   A: Received in formalin, labeled with the patient's name and hospital number and \"cord lipoma\", is a segment of yellow fibroadipose soft tissue measuring 5.5 x 4.7 x 2.3 cm and weighing 19.4 grams.  The specimen is inked.  Serial cross sections reveal a yellow homogeneous cut surface. Representative sections are submitted in 4 cassettes.  [facility]              Specimen Collected: 08/27/24 08:08 Last Resulted: 09/11/24 12:08           Assessment/Plan  Diagnoses and all orders for this visit:  Postop check  Recovering well.  Follow-up as needed.      Alfredito Benavidez MD  "

## 2025-03-30 DIAGNOSIS — I25.10 ATHEROSCLEROTIC HEART DISEASE OF NATIVE CORONARY ARTERY WITHOUT ANGINA PECTORIS: ICD-10-CM

## 2025-03-31 RX ORDER — ATORVASTATIN CALCIUM 20 MG/1
20 TABLET, FILM COATED ORAL DAILY
Qty: 90 TABLET | Refills: 3 | Status: SHIPPED | OUTPATIENT
Start: 2025-03-31

## 2025-05-15 NOTE — PROGRESS NOTES
History Of Present Illness  HPI    July 12, 2024  The patient is a 68-year-old male who complains of a left inguinal hernia.  The hernia has been present for at least 5 years.  Initially it was asymptomatic but recently has been causing left inguinal pain.     May 19, 2025  The patient had open repair of a left direct inguinal hernia with mesh and excision of a moderate-sized cord lipoma on August 27, 2024.     Past medical history:  Laparoscopic cholecystectomy with cholangiogram on March 1, 2019 for acute cholecystitis.  Ventricular tachycardia status post ablation at [facility] in October 2020.  Dilated nonischemic cardiomyopathy.  Coronary disease status post stent placement in April 2020.  On Brilinta and aspirin.  Chronic systolic congestive heart failure  COVID-19 infection in February 2024.  Post COVID fatigue and lethargy.  Mild reduction in LV systolic function which subsequently normalized.     Past Medical History  He has a past medical history of Abnormal findings on diagnostic imaging of heart and coronary circulation, Chronic systolic congestive heart failure (Multi) (03/01/2023), PVC (premature ventricular contraction) (11/28/2023), and Ventricular tachycardia, monomorphic (Multi) (11/28/2023).    Surgical History  He has a past surgical history that includes Other surgical history (03/08/2019); Other surgical history (06/02/2020); and Other surgical history (04/16/2020).     Allergies  Patient has no known allergies.    Social History  He reports that he has never smoked. He has never used smokeless tobacco. He reports current alcohol use. He reports that he does not use drugs.    Family History  Family History[1]    Last Recorded Vitals  There were no vitals taken for this visit.    Physical Exam   ***    Relevant Results       Assessment/Plan   {Assess/PlanSmartLinks:03729}        Alfredito Benavidez MD         [1] No family history on file.     "(06/02/2020); and Other surgical history (04/16/2020).     Allergies  Patient has no known allergies.    Social History  He reports that he has never smoked. He has never used smokeless tobacco. He reports current alcohol use. He reports that he does not use drugs.    Family History  Family History[1]    Last Recorded Vitals  Blood pressure 123/75, pulse 74, temperature 36.5 °C (97.7 °F), temperature source Temporal, resp. rate 17, height 1.854 m (6' 1\"), weight 86.7 kg (191 lb 3.2 oz), SpO2 98%.    Physical Exam  Constitutional: Well-developed, well-nourished, alert and oriented, no acute distress  Skin: Warm and dry, no lesions, no rashes, no jaundice  HEENT: Normocephalic, atraumatic, EOMI, no scleral icterus, eyes have no redness or swelling or discharge, external inspection of ears and nose is normal, mucous membranes moist  Neck: Soft, nontender, no mass or adenopathy  Cardiac: Regular rate and rhythm, no murmur  Chest: Patent airway, clear to auscultation, normal breath sounds with good chest expansion, no wheezes or rales or rhonchi noted, thorax symmetric  Abdomen: Nondistended, positive bowel sounds, soft, nontender, no mass  No inguinal or femoral hernias palpable.  Mild left inguinal discomfort.  Rectal: Not performed  Extremities: No injury, no lower extremity edema or calf tenderness  Lymphatic: No cervical adenopathy  Musculoskeletal: Range of motion intact, no joint swelling, normal strength  Neurological: Alert and oriented x3, intact sensory and motor function, no obvious focal neurologic abnormalities, normal gait  Psychological: Appropriate mood and behavior  Patient declined a chaperone       Assessment/Plan   Diagnoses and all orders for this visit:  Left inguinal pain  69-year-old male with left greater than right inguinal pain.  No inguinal or femoral hernias palpable on examination.  Symptoms may be due to a muscle strain.  Recommend observation.  Follow-up if the pain worsens or does not " resolve or if any lump is noted in the region.      Alfredito Benavidez MD         [1] No family history on file.

## 2025-05-19 ENCOUNTER — APPOINTMENT (OUTPATIENT)
Dept: SURGERY | Facility: CLINIC | Age: 70
End: 2025-05-19
Payer: MEDICARE

## 2025-05-19 VITALS
BODY MASS INDEX: 25.34 KG/M2 | SYSTOLIC BLOOD PRESSURE: 123 MMHG | TEMPERATURE: 97.7 F | HEART RATE: 74 BPM | OXYGEN SATURATION: 98 % | RESPIRATION RATE: 17 BRPM | DIASTOLIC BLOOD PRESSURE: 75 MMHG | HEIGHT: 73 IN | WEIGHT: 191.2 LBS

## 2025-05-19 DIAGNOSIS — R10.32 LEFT INGUINAL PAIN: Primary | ICD-10-CM

## 2025-05-19 PROCEDURE — 3008F BODY MASS INDEX DOCD: CPT | Performed by: SURGERY

## 2025-05-19 PROCEDURE — 1159F MED LIST DOCD IN RCRD: CPT | Performed by: SURGERY

## 2025-05-19 PROCEDURE — 1126F AMNT PAIN NOTED NONE PRSNT: CPT | Performed by: SURGERY

## 2025-05-19 PROCEDURE — 99213 OFFICE O/P EST LOW 20 MIN: CPT | Performed by: SURGERY

## 2025-05-19 PROCEDURE — 1036F TOBACCO NON-USER: CPT | Performed by: SURGERY

## 2025-05-19 RX ORDER — METHOCARBAMOL 750 MG/1
1 TABLET, FILM COATED ORAL
COMMUNITY
Start: 2024-10-28

## 2025-05-19 ASSESSMENT — PAIN SCALES - GENERAL: PAINLEVEL_OUTOF10: 0-NO PAIN

## 2025-05-19 NOTE — LETTER
May 19, 2025     Darius Steward MD  [id number]  [facility] [address]  [facility] [address]    Patient: Spencer Maloney   YOB: 1955   Date of Visit: 5/19/2025       Dear Dr. Darius Steward MD:    Thank you for referring Spencer Maloney to me for evaluation. Below are my notes for this consultation.  If you have questions, please do not hesitate to call me. I look forward to following your patient along with you.       Sincerely,     Alfredito Benavidez MD      CC: No Recipients  ______________________________________________________________________________________    History Of Present Illness  HPI    July 12, 2024  The patient is a 68-year-old male who complains of a left inguinal hernia.  The hernia has been present for at least 5 years.  Initially it was asymptomatic but recently has been causing left inguinal pain.     May 19, 2025  The patient had open repair of a left direct inguinal hernia with mesh and excision of a moderate-sized cord lipoma on August 27, 2024.  His postop pain had completely resolved.  A couple months ago he began having intermittent sharp shooting pain lasting a couple seconds usually in the left inguinal region but occasionally in the right inguinal region.  He lifted a heavy object about 2 weeks ago and the pain worsened, sometimes lasting for several minutes at a time.  No pain between episodes.  He has not noticed a lump in the groin area.  He has not had any nausea or vomiting.  No change in bowel habits.  He has back pain which seems to be a separate issue from the groin discomfort.  Past medical history is unchanged.     Past medical history:  Laparoscopic cholecystectomy with cholangiogram on March 1, 2019 for acute cholecystitis.  Ventricular tachycardia status post ablation at [facility] in October 2020.  Dilated nonischemic cardiomyopathy.  Coronary disease status post stent placement in April 2020.  On Brilinta and aspirin.  Chronic  "systolic congestive heart failure  COVID-19 infection in February 2024.  Post COVID fatigue and lethargy.  Mild reduction in LV systolic function which subsequently normalized.     Past Medical History  He has a past medical history of Abnormal findings on diagnostic imaging of heart and coronary circulation, Chronic systolic congestive heart failure (03/01/2023), PVC (premature ventricular contraction) (11/28/2023), and Ventricular tachycardia, monomorphic (Multi) (11/28/2023).    Surgical History  He has a past surgical history that includes Other surgical history (03/08/2019); Other surgical history (06/02/2020); and Other surgical history (04/16/2020).     Allergies  Patient has no known allergies.    Social History  He reports that he has never smoked. He has never used smokeless tobacco. He reports current alcohol use. He reports that he does not use drugs.    Family History  Family History[1]    Last Recorded Vitals  Blood pressure 123/75, pulse 74, temperature 36.5 °C (97.7 °F), temperature source Temporal, resp. rate 17, height 1.854 m (6' 1\"), weight 86.7 kg (191 lb 3.2 oz), SpO2 98%.    Physical Exam  Constitutional: Well-developed, well-nourished, alert and oriented, no acute distress  Skin: Warm and dry, no lesions, no rashes, no jaundice  HEENT: Normocephalic, atraumatic, EOMI, no scleral icterus, eyes have no redness or swelling or discharge, external inspection of ears and nose is normal, mucous membranes moist  Neck: Soft, nontender, no mass or adenopathy  Cardiac: Regular rate and rhythm, no murmur  Chest: Patent airway, clear to auscultation, normal breath sounds with good chest expansion, no wheezes or rales or rhonchi noted, thorax symmetric  Abdomen: Nondistended, positive bowel sounds, soft, nontender, no mass  No inguinal or femoral hernias palpable.  Mild left inguinal discomfort.  Rectal: Not performed  Extremities: No injury, no lower extremity edema or calf tenderness  Lymphatic: No " cervical adenopathy  Musculoskeletal: Range of motion intact, no joint swelling, normal strength  Neurological: Alert and oriented x3, intact sensory and motor function, no obvious focal neurologic abnormalities, normal gait  Psychological: Appropriate mood and behavior  Patient declined a chaperone       Assessment/Plan  Diagnoses and all orders for this visit:  Left inguinal pain  69-year-old male with left greater than right inguinal pain.  No inguinal or femoral hernias palpable on examination.  Symptoms may be due to a muscle strain.  Recommend observation.  Follow-up if the pain worsens or does not resolve or if any lump is noted in the region.      Alfredito Benavidez MD         [1]  No family history on file.       [1]  No family history on file.

## 2025-07-07 DIAGNOSIS — I25.118: ICD-10-CM

## 2025-07-07 RX ORDER — TICAGRELOR 90 MG/1
90 TABLET ORAL 2 TIMES DAILY
Qty: 180 TABLET | Refills: 3 | Status: SHIPPED | OUTPATIENT
Start: 2025-07-07 | End: 2026-07-07

## 2025-07-09 ENCOUNTER — OFFICE VISIT (OUTPATIENT)
Dept: CARDIOLOGY | Facility: CLINIC | Age: 70
End: 2025-07-09
Payer: MEDICARE

## 2025-07-09 VITALS
SYSTOLIC BLOOD PRESSURE: 124 MMHG | OXYGEN SATURATION: 98 % | BODY MASS INDEX: 25.71 KG/M2 | WEIGHT: 194 LBS | DIASTOLIC BLOOD PRESSURE: 68 MMHG | HEIGHT: 73 IN | HEART RATE: 64 BPM

## 2025-07-09 DIAGNOSIS — I47.29 VENTRICULAR TACHYCARDIA, MONOMORPHIC (MULTI): ICD-10-CM

## 2025-07-09 DIAGNOSIS — I25.10 CORONARY ARTERY DISEASE INVOLVING NATIVE CORONARY ARTERY OF NATIVE HEART WITHOUT ANGINA PECTORIS: Primary | ICD-10-CM

## 2025-07-09 DIAGNOSIS — I49.3 PVC (PREMATURE VENTRICULAR CONTRACTION): Chronic | ICD-10-CM

## 2025-07-09 PROCEDURE — 1036F TOBACCO NON-USER: CPT | Performed by: INTERNAL MEDICINE

## 2025-07-09 PROCEDURE — 3008F BODY MASS INDEX DOCD: CPT | Performed by: INTERNAL MEDICINE

## 2025-07-09 PROCEDURE — 99212 OFFICE O/P EST SF 10 MIN: CPT

## 2025-07-09 PROCEDURE — 99214 OFFICE O/P EST MOD 30 MIN: CPT | Performed by: INTERNAL MEDICINE

## 2025-07-09 PROCEDURE — 1159F MED LIST DOCD IN RCRD: CPT | Performed by: INTERNAL MEDICINE

## 2025-07-09 NOTE — PROGRESS NOTES
"    History Of Present Illness:      This is a 69-year-old male with a history of coronary artery disease and frequent PVCs.  He has run out of Brilinta and is having difficulty affording the medication.  Currently he is on aspirin 81 mg daily.  He has no complaints of palpitations, chest pain, or shortness of breath.    Review of Systems  Other review of systems negative  Last Recorded Vitals:      8/27/2024    10:55 AM 8/27/2024    11:00 AM 8/27/2024    11:15 AM 9/11/2024    10:20 AM 9/18/2024     7:37 AM 5/19/2025    12:58 PM 7/9/2025     3:21 PM   Vitals   Systolic  120 113 112 118 123 124   Diastolic  59 56 70 72 75 68   BP Location    Left arm Right arm Right arm Left arm   Heart Rate 55 55 57 76 64 74 64   Temp     36.9 °C (98.4 °F) 36.5 °C (97.7 °F)    Resp  18 18  16 17    Height    1.854 m (6' 1\") 1.854 m (6' 1\") 1.854 m (6' 1\") 1.854 m (6' 1\")   Weight (lb)    186 190 191.2 194   BMI    24.54 kg/m2 25.07 kg/m2 25.23 kg/m2 25.6 kg/m2   BSA (m2)    2.08 m2 2.11 m2 2.11 m2 2.13 m2   Visit Report    Report Report Report Report     Allergies:  Patient has no known allergies.  Outpatient Medications:  Current Outpatient Medications   Medication Instructions    aspirin 81 mg EC tablet 1 tablet, Daily    atorvastatin (LIPITOR) 20 mg, oral, Daily    Brilinta 90 mg, oral, 2 times daily    gabapentin (NEURONTIN) 300 mg, oral, 3 times daily PRN    lisinopril 10 mg, oral, Daily    metoprolol succinate XL (TOPROL-XL) 25 mg, oral, Daily    oxyCODONE (ROXICODONE) 5 mg, oral, Every 6 hours PRN       Physical Exam:    General Appearance:  Alert, oriented, no distress  Skin:  Warm and dry  Head and Neck:  No elevation of JVP, no carotid bruits  Cardiac Exam:  Rhythm is regular, S1 and S2 are normal, no murmur S3 or S4  Lungs:  Clear to auscultation  Extremities:  no edema  Neurologic:  No focal deficits  Psychiatric:  Appropriate mood and behavior    Lab Results:    CMP:  Recent Labs     08/26/24  0856 03/11/24  1330 " 07/30/22  2343 10/09/20  1241 06/08/20  0959 04/10/20  1147 04/01/20  0450 03/31/20  0940    139 138 137 139 137 137 139   K 4.5 4.6 4.1 4.7 4.3 4.2 4.3 4.0    106 106 101 106 106 106 105   CO2 25 27 25 28 26 24 23 25   ANIONGAP 14 11 11 13 11 11 12 13   BUN 22 19 17 22 19 19 18 22   CREATININE 0.94 0.99 1.05 1.30 1.31* 1.17 1.23 1.34*   EGFR 88 83  --   --   --   --   --   --    MG  --  1.71  --   --   --  1.93 2.11 1.96     Recent Labs     08/26/24  0856 03/11/24  1330 07/31/22  0022 07/30/22  2343 06/08/20  0959 04/10/20  1147 03/31/20  0940 03/01/19  0555   ALBUMIN 4.5 4.3  --  4.2 4.5 4.8   < > 4.8   ALKPHOS 64 65  --  60 81 59   < > 67   ALT 92* 115*  --  96* 78* 17   < > 23   AST 70* 88*  --  73* 32 14   < > 18   BILITOT 0.7 0.8  --  0.7 1.1 1.1   < > 0.9   LIPASE  --   --  22  --   --  20  --  14    < > = values in this interval not displayed.     CBC:  Recent Labs     08/26/24  0856 03/11/24  1330 07/30/22  2343 10/09/20  1241 06/08/20  0959 04/27/20  1700 04/10/20  1147 03/31/20  0940   WBC 5.6 5.9 6.9 5.0 5.9 5.5 5.5 5.3   HGB 15.2 14.0 13.7 14.7 15.5 14.0 15.8 16.8   HCT 45.5 42.3 41.1 45.2 45.3 42.0 47.4 50.8    240 241 269 243 165 214 242   MCV 94 92 92 98 90 91 91 92     COAG:   Recent Labs     08/26/24  0856 07/30/22  2343 10/09/20  1241 06/08/20  0959 04/27/20  0812 03/01/19  0555   INR 0.9 1.0 1.0 1.1 1.1 1.0     Cardiology Tests (personally reviewed):      I have personally review the diagnostic cardiac testing and my interpretation is as follows:     Echocardiogram July 2022: Ejection fraction 45 to 50% with mild aortic valve regurgitation  Echocardiogram November 2023: Ejection fraction 40 to 45% with mild aortic valve regurgitation and mild to moderate mitral valve regurgitation.  Echocardiogram April 2024:  EF 50-55%, mod AR  Assessment/Plan   Problem List Items Addressed This Visit           ICD-10-CM    PVC (premature ventricular contraction) (Chronic) I49.3    1.  PVCs:  Spencer has a history of frequent PVCs with nonsustained ventricular tachycardia, and had a VT ablation at [facility] in October 2020.  The PVC burden has improved substantially and he is minimally symptomatic.    2.  Coronary artery disease: History of single-vessel CAD with stent placement in the LAD in April 2020.  The patient has not tolerated Plavix in the past and he is currently having difficulty affording the Brilinta.  A referral will be placed to the  clinical pharmacy for assistance.  For now he will continue with aspirin 81 mg daily.  I discussed the possibility of trying Plavix again at 75 mg daily.    3.  Dilated cardiomyopathy: The patient previously had severe left ventricular dysfunction with an ejection fraction of 20 to 25%.  In April 2024 his ejection fraction was 50 to 55%.  We will plan for an echocardiogram in 2026, prior to the next office visit.         Ventricular tachycardia, monomorphic (Multi) (Chronic) I47.29    Relevant Orders    Transthoracic echo (TTE) complete     Other Visit Diagnoses         Codes      Coronary artery disease involving native coronary artery of native heart without angina pectoris    -  Primary I25.10    Relevant Orders    Referral to Clinical Pharmacy            James C Ramicone, DO

## 2025-07-09 NOTE — ASSESSMENT & PLAN NOTE
1.  PVCs: Spencer has a history of frequent PVCs with nonsustained ventricular tachycardia, and had a VT ablation at [facility] in October 2020.  The PVC burden has improved substantially and he is minimally symptomatic.    2.  Coronary artery disease: History of single-vessel CAD with stent placement in the LAD in April 2020.  The patient has not tolerated Plavix in the past and he is currently having difficulty affording the Brilinta.  A referral will be placed to the  clinical pharmacy for assistance.  For now he will continue with aspirin 81 mg daily.  I discussed the possibility of trying Plavix again at 75 mg daily.    3.  Dilated cardiomyopathy: The patient previously had severe left ventricular dysfunction with an ejection fraction of 20 to 25%.  In April 2024 his ejection fraction was 50 to 55%.  We will plan for an echocardiogram in 2026, prior to the next office visit.

## 2025-07-14 ENCOUNTER — APPOINTMENT (OUTPATIENT)
Dept: PHARMACY | Facility: HOSPITAL | Age: 70
End: 2025-07-14
Payer: MEDICARE

## 2025-07-14 DIAGNOSIS — I25.10 CORONARY ARTERY DISEASE INVOLVING NATIVE CORONARY ARTERY OF NATIVE HEART WITHOUT ANGINA PECTORIS: ICD-10-CM

## 2025-07-14 NOTE — PROGRESS NOTES
"  Pharmacist Clinic: Cardiology Management    Spencer Maloney is a 69 y.o. male was referred to Clinical Pharmacy Team for antiplatelet management.     Referring Provider: Ramicone, James C, DO    THIS IS A NEW PATIENT APPOINTMENT. PATIENT WILL BE ESTABLISHING CARE WITH CLINICAL PHARMACY.    Appointment was completed by Spencer who was reached at primary number.    Allergies Reviewed? Yes    Allergies[1]    Medical History[2]    Medications Ordered Prior to Encounter[3]      RELEVANT LAB RESULTS:  Lab Results   Component Value Date    BILITOT 0.7 08/26/2024    CALCIUM 9.7 08/26/2024    CO2 25 08/26/2024     08/26/2024    CREATININE 0.94 08/26/2024    GLUCOSE 110 (H) 08/26/2024    ALKPHOS 64 08/26/2024    K 4.5 08/26/2024    PROT 6.6 08/26/2024     08/26/2024    AST 70 (H) 08/26/2024    ALT 92 (H) 08/26/2024    BUN 22 08/26/2024    ANIONGAP 14 08/26/2024    MG 1.71 03/11/2024    ALBUMIN 4.5 08/26/2024    AMYLASE 44 03/01/2019    LIPASE 22 07/31/2022    GFRMALE 78 07/30/2022     Lab Results   Component Value Date    TRIG 158 (H) 04/01/2020    CHOL 173 04/01/2020    HDL 34.6 (A) 04/01/2020     No results found for: \"BMCBC\", \"CBCDIF\"     PHARMACEUTICAL ASSESSMENT:    MEDICATION RECONCILIATION    Was a medication reconciliation completed at this visit? Yes  Home Pharmacy Reviewed? Yes, describe: [facility]; [place], [place]    Not Taking:  -Gabapentin  -Oxycodone    Drug Interactions? No    Medication Documentation Review Audit       Reviewed by Zohra Cardenas CMA (Medical Assistant) on 07/09/25 at 1524      Medication Order Taking? Sig Documenting Provider Last Dose Status   aspirin 81 mg EC tablet [id number] Yes Take 1 tablet (81 mg) by mouth once daily. Historical Provider, MD  Active   atorvastatin (Lipitor) 20 mg tablet [id number] Yes TAKE 1 TABLET BY MOUTH EVERY DAY James C Ramicone,   Active     Discontinued 07/07/25 1144   Brilinta 90 mg tablet [id number] Yes Take 1 tablet (90 mg) by mouth 2 times a " day. James C Ramicone, DO  Active   gabapentin (Neurontin) 300 mg capsule [id number]  Take 1 capsule (300 mg) by mouth 3 times a day as needed (mild pain) for up to 15 doses. Alfredito Benavidez MD  Flag for Review   lisinopril 10 mg tablet [id number] Yes Take 1 tablet (10 mg) by mouth once daily. James C Ramicone, DO  Active     Discontinued 07/09/25 1524   metoprolol succinate XL (Toprol-XL) 25 mg 24 hr tablet [id number] Yes Take 1 tablet (25 mg) by mouth once daily. James C Ramicone, DO  Active   oxyCODONE (Roxicodone) 5 mg immediate release tablet [id number]  Take 1 tablet (5 mg) by mouth every 6 hours if needed for severe pain (7 - 10) (Take if pain is not relieved by combination of gabapentin, Tylenol and ibuprofen) for up to 8 doses. Alfredito Benavidez MD  Flag for Review                    DISEASE MANAGEMENT ASSESSMENT:     ANTIPLATELET ASSESSMENT    The ASCVD Risk score (Taylor DK, et al., 2019) failed to calculate for the following reasons:    Cannot find a previous HDL lab    Cannot find a previous total cholesterol lab    DIAGNOSIS: CAD  - Patient is projected to be on antiplatelet therapy as directed by cardiologist      CURRENT PHARMACOTHERAPY:   Brilinta 90mg twice daily- not taking as prescribed due to cost  Aspirin 81mg daily- patient reports taking twice daily, instructed to take medication once daily    RELEVANT PAST MEDICAL HISTORY:   HFrEF, PVC    Affordability/Accessibility:  PAP screen  Adherence/Organization: non adherent, patient has not taken Brilinta for about 10 days  Adverse Reactions: N/A  Recent Hospitalizations: none   Recent Falls/Trauma: none reported  Changes in Tobacco or Alcohol Intake:   Tobacco: denies  Alcohol: rarely    EDUCATION/COUNSELING:   - Counseled patient on MOA, expectations, duration of therapy, contraindications, administration, and monitoring parameters  - Counseled patient of side effects that are indicative of bleeding such as dark tarry stool, unexplainable  bruising, or vomiting up a coffee ground like substance  - Answered all patient questions and concerns       DISCUSSION/NOTES:   Today was an initial visit to establish with clinical pharmacy. Patient medications and allergies were reviewed and updated. Patient reports he was taking aspirin 81mg twice daily, patient instructed to start taking aspirin 81mg daily, patient verbalized understanding.  Patient has been out of Brilinta for 10 days, he is unable to afford medication. Patient reports no issues when he was taking medication.   Patient was screened for  PAP. Plan to submit PAP application once income documentation is received.    ASSESSMENT:   Patient Assistance Program (PAP)    Application for program to be submitted for the following medications: Brilinta    Prescription Insurance:  Yes   Paid Test Claim:  Yes   County of Permanent Address:  [facility]   Members of Household:  1   Files Taxes:  Yes     Patient will be faxing financial information to pharmacist directly at [phone].    Patient verbally reports monthly or yearly income which is less than 400% federal poverty level    Patient aware this process may take up to 6 weeks.     If approved medication must be filled through [facility] and MEDICATION WILL BE MAILED TO PATIENT.       Assessment/Plan   Problem List Items Addressed This Visit       Coronary artery disease involving native coronary artery of native heart without angina pectoris    Appropriate to continue Brilinta 90mg twice daily and aspirin 81mg daily.          Relevant Orders    Referral to Clinical Pharmacy         RECOMMENDATIONS/PLAN:    CONTINUE  Brilinta 90mg twice daily  Aspirin 81mg daily    Last Appnt with Referring Provider: 07/09/2025  Next Appnt with Referring Provider: 07/08/2026  Clinical Pharmacist follow up: 1 month  VAF/Application Expiration: No  Type of Encounter: Yesi Man PharmD    Verbal consent to manage patient's drug therapy was  obtained from the patient . They were informed they may decline to participate or withdraw from participation in pharmacy services at any time.    Continue all meds under the continuation of care with the referring provider and clinical pharmacy team.            [1] No Known Allergies  [2]   Past Medical History:  Diagnosis Date    Abnormal findings on diagnostic imaging of heart and coronary circulation     Abnormal echocardiogram    Chronic systolic congestive heart failure 03/01/2023    PVC (premature ventricular contraction) 11/28/2023    Ventricular tachycardia, monomorphic (Multi) 11/28/2023   [3]   Current Outpatient Medications on File Prior to Visit   Medication Sig Dispense Refill    aspirin 81 mg EC tablet Take 1 tablet (81 mg) by mouth once daily.      atorvastatin (Lipitor) 20 mg tablet TAKE 1 TABLET BY MOUTH EVERY DAY 90 tablet 3    lisinopril 10 mg tablet Take 1 tablet (10 mg) by mouth once daily. 90 tablet 3    metoprolol succinate XL (Toprol-XL) 25 mg 24 hr tablet Take 1 tablet (25 mg) by mouth once daily. 90 tablet 3    Brilinta 90 mg tablet Take 1 tablet (90 mg) by mouth 2 times a day. (Patient not taking: Reported on 7/14/2025) 180 tablet 3    gabapentin (Neurontin) 300 mg capsule Take 1 capsule (300 mg) by mouth 3 times a day as needed (mild pain) for up to 15 doses. (Patient not taking: Reported on 7/14/2025) 15 capsule 0    oxyCODONE (Roxicodone) 5 mg immediate release tablet Take 1 tablet (5 mg) by mouth every 6 hours if needed for severe pain (7 - 10) (Take if pain is not relieved by combination of gabapentin, Tylenol and ibuprofen) for up to 8 doses. (Patient not taking: Reported on 7/14/2025) 8 tablet 0    [DISCONTINUED] methocarbamol (Robaxin) 750 mg tablet Take 1 tablet (750 mg) by mouth 3 times a day.       No current facility-administered medications on file prior to visit.

## 2025-07-14 NOTE — Clinical Note
Hello Dr. Ramicone,  Today I spoke with Spencer about his antiplatelet therapy. Patient has been out of Brilinta for 10 days and reports he is taking aspirin 81mg twice daily. Instructed patient to take aspirin 81mg once daily. Patient reports no issues with Brilinta when he was taking medication. Patient should qualify for  PAP, plan to submit PAP application once income documentation is received and follow up in 1 month. Thank you!

## 2025-07-15 ENCOUNTER — TELEPHONE (OUTPATIENT)
Dept: CARDIOLOGY | Facility: CLINIC | Age: 70
End: 2025-07-15
Payer: MEDICARE

## 2025-07-15 DIAGNOSIS — I25.10 ATHEROSCLEROTIC HEART DISEASE OF NATIVE CORONARY ARTERY WITHOUT ANGINA PECTORIS: ICD-10-CM

## 2025-07-15 NOTE — TELEPHONE ENCOUNTER
Pts states he is feeling well and doesn't think he needs to be on brilinta    Stopped taking it on 7-4    Clinical pharmacy did reach out to help with cost    Does he need to continue on this medication and proceed with the program?      How should he be taking his aspirin? Once or twice daily. Was previously taking it twice daily, but after speaking with the pharmacy he decreased to once daily

## 2025-07-30 NOTE — TELEPHONE ENCOUNTER
Discussed with Dr Ramicone, he wanted input from Dr Terrence Wells (who performed his LHC in 2020, placed cardiac stent). Per Dr Wells, pt can just take asa 81mg daily at this time. She also recommends increasing atorvastatin to 40mg once daily to get LDL closer to 55 (pt is currently taking ator 20, last LDL on file is from 2023 and LDL=76). Dr Ramicone agreeable.    Called pt, VM picked up but VM full--unable to leave message.

## 2025-08-01 RX ORDER — ATORVASTATIN CALCIUM 40 MG/1
40 TABLET, FILM COATED ORAL DAILY
Qty: 90 TABLET | Refills: 3 | Status: SHIPPED | OUTPATIENT
Start: 2025-08-01

## 2025-08-01 NOTE — TELEPHONE ENCOUNTER
Spoke to pt, notified and agreeable to increasing ator to 40mg daily.    Pended ator to Dr Ramicone. Updated med list.

## 2025-08-18 ENCOUNTER — APPOINTMENT (OUTPATIENT)
Dept: PHARMACY | Facility: HOSPITAL | Age: 70
End: 2025-08-18
Payer: MEDICARE

## 2025-08-18 ENCOUNTER — TELEPHONE (OUTPATIENT)
Dept: PHARMACY | Facility: HOSPITAL | Age: 70
End: 2025-08-18

## 2025-09-03 DIAGNOSIS — I50.22 CHRONIC SYSTOLIC CONGESTIVE HEART FAILURE: ICD-10-CM

## 2025-09-03 DIAGNOSIS — I25.118: ICD-10-CM

## 2025-09-03 RX ORDER — LISINOPRIL 10 MG/1
10 TABLET ORAL DAILY
Qty: 90 TABLET | Refills: 3 | Status: SHIPPED | OUTPATIENT
Start: 2025-09-03

## 2025-09-03 RX ORDER — METOPROLOL SUCCINATE 25 MG/1
25 TABLET, EXTENDED RELEASE ORAL DAILY
Qty: 90 TABLET | Refills: 3 | Status: SHIPPED | OUTPATIENT
Start: 2025-09-03 | End: 2026-09-03

## (undated) DEVICE — DRESSING, TELFA, 3X4

## (undated) DEVICE — DRESSING, TRANSPARENT, TEGADERM, 4 X 4-3/4 IN, NO LABEL

## (undated) DEVICE — Device

## (undated) DEVICE — DRAIN, PENROSE, 0.25 X 18 IN, LATEX, STERILE

## (undated) DEVICE — SLEEVE, VASO PRESS, CALF GARMENT, MEDIUM, GREEN

## (undated) DEVICE — DRAPE, INCISE, ANTIMICROBIAL, IOBAN 2, LARGE, 17 X 23 IN, DISPOSABLE, STERILE